# Patient Record
Sex: FEMALE | Race: BLACK OR AFRICAN AMERICAN | Employment: OTHER | URBAN - METROPOLITAN AREA
[De-identification: names, ages, dates, MRNs, and addresses within clinical notes are randomized per-mention and may not be internally consistent; named-entity substitution may affect disease eponyms.]

---

## 2019-05-06 ENCOUNTER — HOSPITAL ENCOUNTER (EMERGENCY)
Facility: HOSPITAL | Age: 84
Discharge: HOME | End: 2019-05-06
Attending: EMERGENCY MEDICINE
Payer: MEDICARE

## 2019-05-06 VITALS
HEIGHT: 65 IN | SYSTOLIC BLOOD PRESSURE: 155 MMHG | TEMPERATURE: 98 F | WEIGHT: 135 LBS | BODY MASS INDEX: 22.49 KG/M2 | RESPIRATION RATE: 18 BRPM | DIASTOLIC BLOOD PRESSURE: 58 MMHG | HEART RATE: 49 BPM | OXYGEN SATURATION: 93 %

## 2019-05-06 DIAGNOSIS — K40.90 UNILATERAL INGUINAL HERNIA WITHOUT OBSTRUCTION OR GANGRENE, RECURRENCE NOT SPECIFIED: Primary | ICD-10-CM

## 2019-05-06 PROCEDURE — 85025 COMPLETE CBC W/AUTO DIFF WBC: CPT | Performed by: EMERGENCY MEDICINE

## 2019-05-06 PROCEDURE — 36415 COLL VENOUS BLD VENIPUNCTURE: CPT

## 2019-05-06 PROCEDURE — 80048 BASIC METABOLIC PNL TOTAL CA: CPT | Performed by: EMERGENCY MEDICINE

## 2019-05-06 PROCEDURE — 99283 EMERGENCY DEPT VISIT LOW MDM: CPT

## 2019-05-06 NOTE — ED INITIAL ASSESSMENT (HPI)
Patient with known left inguinal hernia. Her daughter has been able to reduce it until today.     Denies any other symptoms

## 2019-05-07 NOTE — ED PROVIDER NOTES
Patient Seen in: [facility] AND [facility] Emergency Department    History   Patient presents with:  Hernia    Stated Complaint:     HPI    80year old female with pmh copd, htn, left inguinal hernia present for past few years who is brought by family for concer passive range of motion without pain. Neck supple. No neck rigidity. Normal range of motion present. Cardiovascular: Normal rate, regular rhythm, normal heart sounds and intact distal pulses. No murmur heard.   Pulmonary/Chest: Effort normal and breath acute on chronic issue regarding L inguinal hernia. On my exam hernia is reducible and family agree the hard mass they felt earlier is gone.  CT ordered to further assess area, however while waiting for PO contrast pt family advises that she is acting john

## 2020-03-20 ENCOUNTER — APPOINTMENT (OUTPATIENT)
Dept: GENERAL RADIOLOGY | Facility: HOSPITAL | Age: 85
DRG: 190 | End: 2020-03-20
Attending: EMERGENCY MEDICINE
Payer: MEDICARE

## 2020-03-20 ENCOUNTER — HOSPITAL ENCOUNTER (INPATIENT)
Facility: HOSPITAL | Age: 85
LOS: 3 days | Discharge: HOME | DRG: 190 | End: 2020-03-23
Attending: EMERGENCY MEDICINE | Admitting: HOSPITALIST
Payer: MEDICARE

## 2020-03-20 DIAGNOSIS — N30.00 ACUTE CYSTITIS WITHOUT HEMATURIA: Primary | ICD-10-CM

## 2020-03-20 DIAGNOSIS — J06.9 VIRAL UPPER RESPIRATORY TRACT INFECTION: ICD-10-CM

## 2020-03-20 PROCEDURE — 99223 1ST HOSP IP/OBS HIGH 75: CPT | Performed by: HOSPITALIST

## 2020-03-20 PROCEDURE — 71045 X-RAY EXAM CHEST 1 VIEW: CPT | Performed by: EMERGENCY MEDICINE

## 2020-03-20 RX ORDER — ACETAMINOPHEN 325 MG/1
650 TABLET ORAL ONCE
Status: COMPLETED | OUTPATIENT
Start: 2020-03-20 | End: 2020-03-20

## 2020-03-20 RX ORDER — ACETAMINOPHEN 500 MG
1000 TABLET ORAL ONCE
Status: DISCONTINUED | OUTPATIENT
Start: 2020-03-20 | End: 2020-03-23

## 2020-03-20 RX ORDER — SODIUM CHLORIDE 9 MG/ML
125 INJECTION, SOLUTION INTRAVENOUS CONTINUOUS
Status: DISCONTINUED | OUTPATIENT
Start: 2020-03-20 | End: 2020-03-21

## 2020-03-20 NOTE — ED PROVIDER NOTES
Patient Seen in: [facility] AND [facility] Emergency Department      History   Patient presents with:  Cough/URI    Stated Complaint: cough and fever    HPI    The patient is an 60-year-old female with a history of COPD and hypertension who presents from home wi Eyes:      Conjunctiva/sclera: Conjunctivae normal.      Pupils: Pupils are equal, round, and reactive to light. Neck:      Musculoskeletal: Normal range of motion and neck supple. Vascular: No JVD.    Cardiovascular:      Rate and Rhythm: Normal r following components:    C-Reactive Protein 1.22 (*)     All other components within normal limits   CBC W/ DIFFERENTIAL - Abnormal; Notable for the following components:    RBC 5.31 (*)     HCT 48.4 (*)     Lymphocyte Absolute 0.82 (*)     All other compo with volume loss likely related to scarring. 4. Moderate to large retrocardiac hiatal hernia.     Dictated by (CST): Ira Portillo MD on 3/20/2020 at 8:17 PM     Finalized by (CST): Ira Portillo MD on 3/20/2020 at 8:19 PM            Radiology exams  View

## 2020-03-21 PROCEDURE — 99233 SBSQ HOSP IP/OBS HIGH 50: CPT | Performed by: HOSPITALIST

## 2020-03-21 RX ORDER — SODIUM CHLORIDE 9 MG/ML
INJECTION, SOLUTION INTRAVENOUS CONTINUOUS
Status: DISCONTINUED | OUTPATIENT
Start: 2020-03-21 | End: 2020-03-23

## 2020-03-21 RX ORDER — ALBUTEROL SULFATE 90 UG/1
1 AEROSOL, METERED RESPIRATORY (INHALATION)
Status: DISCONTINUED | OUTPATIENT
Start: 2020-03-21 | End: 2020-03-23

## 2020-03-21 RX ORDER — METHYLPREDNISOLONE SODIUM SUCCINATE 40 MG/ML
32 INJECTION, POWDER, LYOPHILIZED, FOR SOLUTION INTRAMUSCULAR; INTRAVENOUS EVERY 12 HOURS
Status: DISCONTINUED | OUTPATIENT
Start: 2020-03-21 | End: 2020-03-22

## 2020-03-21 RX ORDER — ONDANSETRON 2 MG/ML
4 INJECTION INTRAMUSCULAR; INTRAVENOUS EVERY 6 HOURS PRN
Status: DISCONTINUED | OUTPATIENT
Start: 2020-03-21 | End: 2020-03-23

## 2020-03-21 RX ORDER — DORZOLAMIDE HYDROCHLORIDE AND TIMOLOL MALEATE 20; 5 MG/ML; MG/ML
1 SOLUTION/ DROPS OPHTHALMIC NIGHTLY
Status: DISCONTINUED | OUTPATIENT
Start: 2020-03-21 | End: 2020-03-23

## 2020-03-21 RX ORDER — METOPROLOL TARTRATE 50 MG/1
50 TABLET, FILM COATED ORAL 2 TIMES DAILY
Status: ON HOLD | COMMUNITY
End: 2020-09-26

## 2020-03-21 RX ORDER — POLYETHYLENE GLYCOL 3350 17 G/17G
17 POWDER, FOR SOLUTION ORAL DAILY
Status: ON HOLD | COMMUNITY
End: 2020-09-26

## 2020-03-21 RX ORDER — LATANOPROST 50 UG/ML
1 SOLUTION/ DROPS OPHTHALMIC NIGHTLY
Status: DISCONTINUED | OUTPATIENT
Start: 2020-03-21 | End: 2020-03-23

## 2020-03-21 RX ORDER — FAMOTIDINE 10 MG/ML
20 INJECTION, SOLUTION INTRAVENOUS DAILY
Status: DISCONTINUED | OUTPATIENT
Start: 2020-03-21 | End: 2020-03-23

## 2020-03-21 RX ORDER — POTASSIUM CHLORIDE 20 MEQ/1
40 TABLET, EXTENDED RELEASE ORAL EVERY 4 HOURS
Status: DISCONTINUED | OUTPATIENT
Start: 2020-03-21 | End: 2020-03-21

## 2020-03-21 RX ORDER — HYDROCHLOROTHIAZIDE 12.5 MG/1
12.5 CAPSULE, GELATIN COATED ORAL DAILY
Status: ON HOLD | COMMUNITY
End: 2020-09-26

## 2020-03-21 RX ORDER — POTASSIUM CHLORIDE 20 MEQ/1
40 TABLET, EXTENDED RELEASE ORAL EVERY 4 HOURS
Status: COMPLETED | OUTPATIENT
Start: 2020-03-21 | End: 2020-03-21

## 2020-03-21 RX ORDER — ACETAMINOPHEN 325 MG/1
650 TABLET ORAL EVERY 6 HOURS PRN
Status: DISCONTINUED | OUTPATIENT
Start: 2020-03-21 | End: 2020-03-23

## 2020-03-21 RX ORDER — HYDRALAZINE HYDROCHLORIDE 20 MG/ML
10 INJECTION INTRAMUSCULAR; INTRAVENOUS EVERY 4 HOURS PRN
Status: DISCONTINUED | OUTPATIENT
Start: 2020-03-21 | End: 2020-03-23

## 2020-03-21 RX ORDER — HEPARIN SODIUM 5000 [USP'U]/ML
5000 INJECTION, SOLUTION INTRAVENOUS; SUBCUTANEOUS EVERY 12 HOURS SCHEDULED
Status: DISCONTINUED | OUTPATIENT
Start: 2020-03-21 | End: 2020-03-23

## 2020-03-21 RX ORDER — AZITHROMYCIN 250 MG/1
500 TABLET, FILM COATED ORAL DAILY
Status: DISCONTINUED | OUTPATIENT
Start: 2020-03-21 | End: 2020-03-23

## 2020-03-21 RX ORDER — METHYLPREDNISOLONE SODIUM SUCCINATE 40 MG/ML
40 INJECTION, POWDER, LYOPHILIZED, FOR SOLUTION INTRAMUSCULAR; INTRAVENOUS EVERY 12 HOURS
Status: DISCONTINUED | OUTPATIENT
Start: 2020-03-21 | End: 2020-03-21

## 2020-03-21 RX ORDER — LATANOPROST 50 UG/ML
SOLUTION/ DROPS OPHTHALMIC NIGHTLY
COMMUNITY

## 2020-03-21 RX ORDER — DORZOLAMIDE HYDROCHLORIDE AND TIMOLOL MALEATE 20; 5 MG/ML; MG/ML
1 SOLUTION/ DROPS OPHTHALMIC 2 TIMES DAILY
COMMUNITY

## 2020-03-21 RX ORDER — SODIUM CHLORIDE 9 MG/ML
INJECTION, SOLUTION INTRAVENOUS CONTINUOUS
Status: DISCONTINUED | OUTPATIENT
Start: 2020-03-21 | End: 2020-03-21

## 2020-03-21 NOTE — PROGRESS NOTES
[facility]    Progress Note    [facility] Patient Status:  Inpatient    1934 MRN [id number]   Location [facility]5W Attending Twyla Modi Day # 1 PCP None Pcp        Subjective:     Constitutional: health     Disposition  Home Monday may need extra help at home; pt wheelchair bound    cxr images reviewed    39 min spent on pt of which 22 min spent coordinating care with sw/nurse and counseling pts daughter with pts nodded permission about need fu cov

## 2020-03-21 NOTE — SLP NOTE
ADULT SWALLOWING EVALUATION    ASSESSMENT    ASSESSMENT/OVERALL IMPRESSION:  SLP BSSE orders received and acknowledged. A swallow evaluation warranted as pt admitted with SOB, fever, cough for reportedly 2 days.  Pt with clear vocal quality, on room air, wi History  Past Medical History:   Diagnosis Date   • COPD (chronic obstructive pulmonary disease) ([facility] [address]    • Essential hypertension    • Glaucoma    • Inguinal hernia        Prior Living Situation: Home with support  Diet Prior to Admission: Regular; Thin li session(s).     In Progress   Goal #3 The patient will utilize compensatory strategies as outlined by  BSSE (clinical evaluation) including Slow rate, Small bites, Small sips, Multiple swallows, Alternate liquids/solids, No straws, Upright 90 degrees with m

## 2020-03-21 NOTE — H&P
[address] Patient Status:  Emergency    1934 MRN [id number]   Location [facility] Attending Marielos Gibson MD   Hosp Day # 0 PCP None Pcp     Date:  3/20/2020 Normocephalic, oral mucosa is moist.  Head:  Normocephalic, atraumatic. Neck:  Supple, non-tender, no carotid bruit, no jugular venous distention, no lymphadenopathy, no thyromegaly.   Respiratory:  Lungs are clear to auscultation, no significant wheezing COVID-19. Acute UTI  Patient start Rocephin 1 g IV piggyback every 24 hours, cultures pending at this time. Uncontrolled hypertension  We will resume home dose of medication, use IV hydralazine as needed for systolic pressures greater than 160.     GE

## 2020-03-21 NOTE — PLAN OF CARE
Pt resting in bed, ambulates with assistance. IVF infusing. IV abx. IV solumedrol. Speech therapist cleared pt for regular diet with thin liquids after swallow eval. PT/OT ordered. Incontinent care provided. No complaints at this time.      Problem: Daivna handling equipment as needed  - Ensure adequate protection for wounds/incisions during mobilization  - Obtain PT/OT consults as needed  - Advance activity as appropriate  - Communicate ordered activity level and limitations with patient/family  Outcome: Pr

## 2020-03-21 NOTE — CM/SW NOTE
03/21/20 1300   CM/SW Referral Data   Referral Source Physician   Reason for Referral Discharge planning   Patient Info   Advanced directives? No   Information provided?  Yes   Patient's Mental Status Alert;Confused;Memory Impairments   Patient's Home En has a transfer wheelchair. Will follow up for choice of HH and on status of DME. Addendum 3/23/2020- Pt ready for discharge. Left son, Terald Lefort,  a message to determine if he would like HH. Also called Bill Chin RN to determine need for [facility].   Per Bill Chin, no

## 2020-03-21 NOTE — PLAN OF CARE
Patient's son Mariam Aguirre informed [place] that he and his sister Isabella Mera) was planning on discussing advanced directives for patient when Shirley Larson returns from [address].  Writer explained that until a definitive decision can be made the patient will be FULL CODE

## 2020-03-21 NOTE — PLAN OF CARE
Patient with no complaints over night. She is very forgetful with intermitent episodes of confusion (especially upon awakening), but she is very pleasant and easily redirected.  IV abx for UTI/URI, r/o COVID-19, PT/OT/SL ordered due to son stating that the Assist with transfers and ambulation using safe patient handling equipment as needed  - Ensure adequate protection for wounds/incisions during mobilization  - Obtain PT/OT consults as needed  - Advance activity as appropriate  - Communicate ordered activit choose  - Honor patient and family perspectives and choices   Outcome: Progressing

## 2020-03-21 NOTE — ED NOTES
Writer spoke with receiving lab who reports able to run pro calcitonin off rainbow draw collected earlier.

## 2020-03-22 PROCEDURE — 99233 SBSQ HOSP IP/OBS HIGH 50: CPT | Performed by: HOSPITALIST

## 2020-03-22 RX ORDER — METHYLPREDNISOLONE SODIUM SUCCINATE 40 MG/ML
20 INJECTION, POWDER, LYOPHILIZED, FOR SOLUTION INTRAMUSCULAR; INTRAVENOUS EVERY 12 HOURS
Status: DISCONTINUED | OUTPATIENT
Start: 2020-03-22 | End: 2020-03-23

## 2020-03-22 NOTE — PLAN OF CARE
Problem: Patient/Family Goals  Goal: Patient/Family Long Term Goal  Description  Patient's Long Term Goal: patient's son - Ric Pang would like for patient to return home. Interventions:  - IV abx, PT/OT/SL, IVF, blood cultures.   - See additional Care Plan patient/family  Outcome: Progressing     Problem: Impaired Swallowing  Goal: Minimize aspiration risk  Description  Interventions:  - Patient should be alert and upright for all feedings (90 degrees preferred)  - Offer food and liquids at a slow rate  - No

## 2020-03-22 NOTE — PROGRESS NOTES
[facility] Pharmacy Note:  Renal Adjustment for cefazolin (ANCEF)    Padma Rodriguez is a 80year old female who has been prescribed cefazolin (ANCEF) 1000 mg every 8 hrs. CrCl is estimated creatinine clearance is 37 mL/min (A) (based on SCr of 1.04 mg/dL (H)).

## 2020-03-22 NOTE — PROGRESS NOTES
[facility]    Progress Note    Dierdre Mast Patient Status:  Inpatient    1934 MRN [id number]   Location [facility]5W Attending Twyla Modi Day # 2 PCP None Pcp        Subjective:     Constitutional: agitation.     Prophylaxis  Subcutaneous heparin     CODE STATUS  Full code dw family     Primary care physician  Dr Chua [facility]     Disposition  Home Monday may need extra help at home; pt wheelchair bound      36 min spent on pt of which 20 Amy Cruz MD  3/21/2020

## 2020-03-23 VITALS
DIASTOLIC BLOOD PRESSURE: 85 MMHG | WEIGHT: 147.88 LBS | TEMPERATURE: 98 F | HEART RATE: 80 BPM | SYSTOLIC BLOOD PRESSURE: 111 MMHG | RESPIRATION RATE: 18 BRPM | OXYGEN SATURATION: 99 % | HEIGHT: 66 IN | BODY MASS INDEX: 23.77 KG/M2

## 2020-03-23 PROCEDURE — 99239 HOSP IP/OBS DSCHRG MGMT >30: CPT | Performed by: HOSPITALIST

## 2020-03-23 RX ORDER — CEPHALEXIN 500 MG/1
500 CAPSULE ORAL 3 TIMES DAILY
Qty: 13 CAPSULE | Refills: 0 | Status: ON HOLD | OUTPATIENT
Start: 2020-03-23 | End: 2020-09-26

## 2020-03-23 RX ORDER — ALBUTEROL SULFATE 90 UG/1
1 AEROSOL, METERED RESPIRATORY (INHALATION)
Qty: 1 INHALER | Refills: 0 | Status: ON HOLD | OUTPATIENT
Start: 2020-03-23 | End: 2020-09-26

## 2020-03-23 RX ORDER — PREDNISONE 10 MG/1
40 TABLET ORAL DAILY
Qty: 17 TABLET | Refills: 0 | Status: ON HOLD | OUTPATIENT
Start: 2020-03-23 | End: 2020-09-26

## 2020-03-23 RX ORDER — ACETAMINOPHEN 325 MG/1
650 TABLET ORAL EVERY 6 HOURS PRN
Qty: 1 TABLET | Refills: 0 | Status: ON HOLD | COMMUNITY
Start: 2020-03-23 | End: 2020-09-26

## 2020-03-23 NOTE — PLAN OF CARE
Pt resting in bed, ambulates with assistance. Pt used rolling chair and stand & pivot to use toilet. Incontinent care provided. Receiving IV abx and IV solumedrol. Received PRN tylenol for pain in left foot. Contact/droplet isolation for RO Coronavirus.  No Assess patient stability and activity tolerance for standing, transferring and ambulating w/ or w/o assistive devices  - Assist with transfers and ambulation using safe patient handling equipment as needed  - Ensure adequate protection for wounds/incisions perspectives and choices   Outcome: Progressing

## 2020-03-23 NOTE — DISCHARGE SUMMARY
[facility]    PATIENT'S NAME: Edda Ball   ATTENDING PHYSICIAN: Tyson Modi MD   PATIENT ACCOUNT#:   [id number]    LOCATION:  [address] [place] [address] RECORD #:   [id number]       YOB: 1934  ADMISSION DATE:       03/20/20 exacerbation, stable. The patient currently doing well. Did have a dry cough, started on Zithromax. Actually was placed in isolation, and all isolation procedures were meticulously followed. We are awaiting rule out for COVID 19.   I discussed with her

## 2020-03-23 NOTE — DISCHARGE SUMMARY
Dc summary#[id number]  > 30 min spent on [facility] Discharge Diagnoses: uti with sepsis, bronchitis    Lace+ Score: 45  59-90 High Risk  29-58 Medium Risk  0-28   Low Risk. TCM Follow-Up Recommendation:  LACE 29-58:  Moderate Risk of readmission after

## 2020-03-23 NOTE — PLAN OF CARE
SBP 180s this AM. PRN IV Hydralazine administered. Con't IVF, IV solumedrol, and IV abx. Con't prompt incontinence care. Plan to be discharged home with family when medically cleared. Pending COVID results.      Problem: Patient/Family Goals  Goal: Patient/ Ensure adequate protection for wounds/incisions during mobilization  - Obtain PT/OT consults as needed  - Advance activity as appropriate  - Communicate ordered activity level and limitations with patient/family  Outcome: Progressing     Problem: Impaired Melolabial Transposition Flap Text: The defect edges were debeveled with a #15 scalpel blade.  Given the location of the defect and the proximity to free margins a melolabial flap was deemed most appropriate.  Using a sterile surgical marker, an appropriate melolabial transposition flap was drawn incorporating the defect.    The area thus outlined was incised deep to adipose tissue with a #15 scalpel blade.  The skin margins were undermined to an appropriate distance in all directions utilizing iris scissors.

## 2020-03-24 ENCOUNTER — TELEPHONE (OUTPATIENT)
Dept: INTERNAL MEDICINE CLINIC | Facility: CLINIC | Age: 85
End: 2020-03-24

## 2020-03-24 NOTE — TELEPHONE ENCOUNTER
Spoke with patient's daughter (patient unable to receive results d/t dementia). Per daughter they have already received COVID-19 test result but wished to discuss tx protocol.  The following information was provided:     Spoke with patient's daughter and co

## 2020-03-25 NOTE — PAYOR COMM NOTE
--------------  ADMISSION REVIEW     Liang Blum MA O  Subscriber #:  [id number]  Authorization Number: [id number]    Admit date: 3/20/20  Admit time: 2348       Admitting Physician: Moreno Cristina MD  Attending Physician:  No att. providers found  [facility] Temp src Oral   SpO2 97 %   O2 Device None (Room air)       Current:BP (!) 179/80   Pulse 80   Temp 100 °F (37.8 °C) (Oral)   Resp 20   Ht 167.6 cm (5' 6\")   Wt 72.6 kg   SpO2 93%   BMI 25.82 kg/m²          Physical Exam  Vitals signs and nursing note rev Result Value    Sodium 135 (*)     Creatinine 1.32 (*)     BUN/CREA Ratio 6.1 (*)     GFR, Non- 37 (*)     GFR, -American 42 (*)     All other components within normal limits   URINALYSIS WITH CULTURE REFLEX - Abnormal; Notable f Patient with positive urinary tract infection for which ceftriaxone was started but also continues to have mild tachypnea and coarse breath sounds with normal chest x-ray. Covid test is pending. Patient comfortable with admission plan.   Vital signs stabl Author:  Naina Abbott MD Service:  Curly Goldmann Author Type:  Physician    Filed:  3/21/2020  7:19 AM Date of Service:  3/20/2020 10:25 PM Status:  Signed    :  Naina Abbott MD (Physician)         [facility] 73 BP: (168-179)/(80-87) 179/80    General: Awake but confused. Diffuse skin problem:  None. Eye:  Pupils are equal, round and reactive to light, extraocular movements are intact, Normal conjunctiva.   HENT:  Normocephalic, oral mucosa is moist.  Head:  Norm We will start patient on duo nebs/MDI and steroids, start Zosyn 3.375 g IV piggyback every 8 hours considering possibility of aspiration as a cause. Expanded flu panel negative, patient to be ruled out for COVID-19.     Acute UTI  Patient start Rocephin 1 Blood pressure 135/52, pulse 97, temperature 98.1 °F (36.7 °C), temperature source Oral, resp. rate 21, height 5' 6\" (1.676 m), weight 143 lb (64.9 kg), SpO2 95 %.     Nursing note and vitals reviewed. Constitutional: She is thin.  She appears well-devel   TROP <0.045 03/20/2020         Xr Chest Ap Portable  (cpt=71045)     Result Date: 3/20/2020  CONCLUSION:  1. No evidence of pneumonia. 2. Slight elevation of the right hemidiaphragm with atelectasis and or scar in the right lower lung.  3. Right apical pl Gastrointestinal: Negative for abdominal pain. Genitourinary: Negative for dysuria. Musculoskeletal: Positive for joint swelling and joint pain. Neurological: Positive for weakness. Negative for light-headedness.    Psychiatric/Behavioral: Positive fo Results:            Lab Results   Component Value Date     WBC 4.7 03/22/2020     HGB 12.5 03/22/2020     HCT 39.1 03/22/2020     .0 03/22/2020     CREATSERUM 1.04 (H) 03/22/2020     BUN 10 03/22/2020      03/22/2020     K 4.4 03/22/2020     C

## 2020-03-25 NOTE — PAYOR COMM NOTE
--------------  DISCHARGE REVIEW    Keven Landers MA [facility]  Subscriber #:  [id number]  Authorization Number: [id number]    Admit date: 3/20/20  Admit time:  2348  Discharge Date: 3/23/2020  2:13 PM     Admitting Physician: Renae Aguilar MD  Attending Physic wheelchair-bound, but her daughter stated that they probably would not need or qualify for any more resources and would be able to take her home.  All infection control procedures were followed as per recommendations    PHYSICAL EXAMINATION:    VITAL SIGNS: hours as needed. ACTIVITY:  As tolerated. DIET:  Low salt, low cholesterol. FOLLOWUP:  Dr. Tru Montesinos at [facility]. Follow up for COVID results. I discussed the importance of needing a definite answer.   Follow up to check on her when it is

## 2020-04-04 ENCOUNTER — HOSPITAL ENCOUNTER (EMERGENCY)
Facility: HOSPITAL | Age: 85
Discharge: HOME | End: 2020-04-04
Attending: EMERGENCY MEDICINE
Payer: MEDICARE

## 2020-04-04 ENCOUNTER — APPOINTMENT (OUTPATIENT)
Dept: GENERAL RADIOLOGY | Facility: HOSPITAL | Age: 85
End: 2020-04-04
Attending: EMERGENCY MEDICINE
Payer: MEDICARE

## 2020-04-04 VITALS
TEMPERATURE: 99 F | HEART RATE: 84 BPM | WEIGHT: 165 LBS | SYSTOLIC BLOOD PRESSURE: 142 MMHG | RESPIRATION RATE: 21 BRPM | BODY MASS INDEX: 28.17 KG/M2 | DIASTOLIC BLOOD PRESSURE: 66 MMHG | OXYGEN SATURATION: 95 % | HEIGHT: 64 IN

## 2020-04-04 DIAGNOSIS — E86.0 DEHYDRATION: Primary | ICD-10-CM

## 2020-04-04 PROCEDURE — 93005 ELECTROCARDIOGRAM TRACING: CPT

## 2020-04-04 PROCEDURE — 99284 EMERGENCY DEPT VISIT MOD MDM: CPT

## 2020-04-04 PROCEDURE — 93010 ELECTROCARDIOGRAM REPORT: CPT | Performed by: EMERGENCY MEDICINE

## 2020-04-04 PROCEDURE — 71045 X-RAY EXAM CHEST 1 VIEW: CPT | Performed by: EMERGENCY MEDICINE

## 2020-04-04 PROCEDURE — 85025 COMPLETE CBC W/AUTO DIFF WBC: CPT | Performed by: EMERGENCY MEDICINE

## 2020-04-04 PROCEDURE — 80076 HEPATIC FUNCTION PANEL: CPT | Performed by: EMERGENCY MEDICINE

## 2020-04-04 PROCEDURE — 84484 ASSAY OF TROPONIN QUANT: CPT | Performed by: EMERGENCY MEDICINE

## 2020-04-04 PROCEDURE — 96360 HYDRATION IV INFUSION INIT: CPT

## 2020-04-04 PROCEDURE — 81001 URINALYSIS AUTO W/SCOPE: CPT | Performed by: EMERGENCY MEDICINE

## 2020-04-04 PROCEDURE — 80048 BASIC METABOLIC PNL TOTAL CA: CPT | Performed by: EMERGENCY MEDICINE

## 2020-04-05 NOTE — ED NOTES
Pt resting comfortably in stretcher. She is able to answer questions appropriately during RN reassessment. IVF bolus infusing.

## 2020-04-05 NOTE — ED PROVIDER NOTES
Patient Seen in: [facility] AND [facility] Emergency Department      History   Patient presents with:  Fatigue    Stated Complaint: decreased appetitie weakness     HPI    63-year-old female presents the ER for generalized weakness decreased appetite.   Patient w Conjunctiva/sclera: Conjunctivae normal.      Pupils: Pupils are equal, round, and reactive to light. Neck:      Musculoskeletal: Normal range of motion and neck supple. Cardiovascular:      Rate and Rhythm: Normal rate and regular rhythm.       Pu WITH DIFFERENTIAL WITH PLATELET.   Procedure                               Abnormality         Status                     ---------                               -----------         ------                     CBC W/ DIFFERENTIAL[[id number]]          Abnormal

## 2020-04-05 NOTE — ED NOTES
DAUGHTER AT BEDSIDE WHO PROVIDES HISTORY FOR PATIENT. PT HAS BASELINE DEMENTIA, HOWEVER IN THE PAST FEW DAYS HAS BEEN LESS VERBAL, NOT PERFORMING ADL'S INDEPENDENTLY AND REQUIRING MORE ASSISTANCE WITH AMBULATION. USES A WALKER AT HOME.  PT IS ALERT NOW, BUT

## 2020-04-05 NOTE — CM/SW NOTE
Called [facility] at Ext [phone] and LM to set up [facility] for patient and to call [facility] for any questions. Face to Face completed. MD and RN aware.

## 2020-04-05 NOTE — CM/SW NOTE
Spoke with daughter and patient regarding patient's current home situation. Patient has admitted to [facility] on 3/20/20 for UTI and viral respiratory tract infection and was tested COVID-19 positive.   Two of patient's children, a son and daughter, as also posi

## 2020-09-07 ENCOUNTER — APPOINTMENT (OUTPATIENT)
Dept: CT IMAGING | Facility: HOSPITAL | Age: 85
End: 2020-09-07
Attending: EMERGENCY MEDICINE
Payer: MEDICARE

## 2020-09-07 ENCOUNTER — HOSPITAL ENCOUNTER (EMERGENCY)
Facility: HOSPITAL | Age: 85
Discharge: HOME | End: 2020-09-07
Attending: EMERGENCY MEDICINE
Payer: MEDICARE

## 2020-09-07 VITALS
TEMPERATURE: 99 F | RESPIRATION RATE: 15 BRPM | OXYGEN SATURATION: 98 % | SYSTOLIC BLOOD PRESSURE: 148 MMHG | HEART RATE: 73 BPM | DIASTOLIC BLOOD PRESSURE: 87 MMHG

## 2020-09-07 DIAGNOSIS — R11.2 NON-INTRACTABLE VOMITING WITH NAUSEA, UNSPECIFIED VOMITING TYPE: Primary | ICD-10-CM

## 2020-09-07 LAB
ALBUMIN SERPL-MCNC: 3.5 G/DL (ref 3.4–5)
ALP LIVER SERPL-CCNC: 96 U/L (ref 55–142)
ALT SERPL-CCNC: 9 U/L (ref 13–56)
ANION GAP SERPL CALC-SCNC: 7 MMOL/L (ref 0–18)
AST SERPL-CCNC: 24 U/L (ref 15–37)
BASOPHILS # BLD AUTO: 0.03 X10(3) UL (ref 0–0.2)
BASOPHILS NFR BLD AUTO: 0.4 %
BILIRUB DIRECT SERPL-MCNC: 0.2 MG/DL (ref 0–0.2)
BILIRUB SERPL-MCNC: 0.9 MG/DL (ref 0.1–2)
BILIRUB UR QL: NEGATIVE
BUN BLD-MCNC: 14 MG/DL (ref 7–18)
BUN/CREAT SERPL: 11.3 (ref 10–20)
CALCIUM BLD-MCNC: 9.4 MG/DL (ref 8.5–10.1)
CHLORIDE SERPL-SCNC: 101 MMOL/L (ref 98–112)
CO2 SERPL-SCNC: 32 MMOL/L (ref 21–32)
COLOR UR: YELLOW
CREAT BLD-MCNC: 1.24 MG/DL (ref 0.55–1.02)
DEPRECATED RDW RBC AUTO: 41 FL (ref 35.1–46.3)
EOSINOPHIL # BLD AUTO: 0.01 X10(3) UL (ref 0–0.7)
EOSINOPHIL NFR BLD AUTO: 0.1 %
ERYTHROCYTE [DISTWIDTH] IN BLOOD BY AUTOMATED COUNT: 12.4 % (ref 11–15)
GLUCOSE BLD-MCNC: 101 MG/DL (ref 70–99)
GLUCOSE UR-MCNC: NEGATIVE MG/DL
HCT VFR BLD AUTO: 45.4 % (ref 35–48)
HGB BLD-MCNC: 14.5 G/DL (ref 12–16)
IMM GRANULOCYTES # BLD AUTO: 0.01 X10(3) UL (ref 0–1)
IMM GRANULOCYTES NFR BLD: 0.1 %
LEUKOCYTE ESTERASE UR QL STRIP.AUTO: NEGATIVE
LIPASE SERPL-CCNC: 89 U/L (ref 73–393)
LYMPHOCYTES # BLD AUTO: 1.12 X10(3) UL (ref 1–4)
LYMPHOCYTES NFR BLD AUTO: 13.7 %
M PROTEIN MFR SERPL ELPH: 8.2 G/DL (ref 6.4–8.2)
MCH RBC QN AUTO: 28.7 PG (ref 26–34)
MCHC RBC AUTO-ENTMCNC: 31.9 G/DL (ref 31–37)
MCV RBC AUTO: 89.7 FL (ref 80–100)
MONOCYTES # BLD AUTO: 0.45 X10(3) UL (ref 0.1–1)
MONOCYTES NFR BLD AUTO: 5.5 %
NEUTROPHILS # BLD AUTO: 6.56 X10 (3) UL (ref 1.5–7.7)
NEUTROPHILS # BLD AUTO: 6.56 X10(3) UL (ref 1.5–7.7)
NEUTROPHILS NFR BLD AUTO: 80.2 %
NITRITE UR QL STRIP.AUTO: NEGATIVE
OSMOLALITY SERPL CALC.SUM OF ELEC: 291 MOSM/KG (ref 275–295)
PH UR: 5 [PH] (ref 5–8)
PLATELET # BLD AUTO: 201 10(3)UL (ref 150–450)
POTASSIUM SERPL-SCNC: 3.8 MMOL/L (ref 3.5–5.1)
PROT UR-MCNC: 30 MG/DL
RBC # BLD AUTO: 5.06 X10(6)UL (ref 3.8–5.3)
RBC #/AREA URNS AUTO: 23 /HPF
SODIUM SERPL-SCNC: 140 MMOL/L (ref 136–145)
SP GR UR STRIP: 1.02 (ref 1–1.03)
UROBILINOGEN UR STRIP-ACNC: 2
WBC # BLD AUTO: 8.2 X10(3) UL (ref 4–11)
WBC #/AREA URNS AUTO: 3 /HPF

## 2020-09-07 PROCEDURE — 83690 ASSAY OF LIPASE: CPT | Performed by: EMERGENCY MEDICINE

## 2020-09-07 PROCEDURE — 96361 HYDRATE IV INFUSION ADD-ON: CPT

## 2020-09-07 PROCEDURE — 74176 CT ABD & PELVIS W/O CONTRAST: CPT | Performed by: EMERGENCY MEDICINE

## 2020-09-07 PROCEDURE — 80048 BASIC METABOLIC PNL TOTAL CA: CPT | Performed by: EMERGENCY MEDICINE

## 2020-09-07 PROCEDURE — 81001 URINALYSIS AUTO W/SCOPE: CPT | Performed by: EMERGENCY MEDICINE

## 2020-09-07 PROCEDURE — 96374 THER/PROPH/DIAG INJ IV PUSH: CPT

## 2020-09-07 PROCEDURE — 99284 EMERGENCY DEPT VISIT MOD MDM: CPT

## 2020-09-07 PROCEDURE — 85025 COMPLETE CBC W/AUTO DIFF WBC: CPT | Performed by: EMERGENCY MEDICINE

## 2020-09-07 PROCEDURE — 80076 HEPATIC FUNCTION PANEL: CPT | Performed by: EMERGENCY MEDICINE

## 2020-09-07 RX ORDER — ONDANSETRON 4 MG/1
4 TABLET, ORALLY DISINTEGRATING ORAL EVERY 4 HOURS PRN
Qty: 10 TABLET | Refills: 0 | Status: ON HOLD | OUTPATIENT
Start: 2020-09-07 | End: 2020-09-26

## 2020-09-07 RX ORDER — ONDANSETRON 2 MG/ML
4 INJECTION INTRAMUSCULAR; INTRAVENOUS ONCE
Status: COMPLETED | OUTPATIENT
Start: 2020-09-07 | End: 2020-09-07

## 2020-09-07 NOTE — ED PROVIDER NOTES
Patient Seen in: [facility] AND [facility] Emergency Department      History   Patient presents with:  Nausea/Vomiting/Diarrhea    Stated Complaint: vomiting    HPI    45-year-old female with past medical history significant for COPD, high blood pressure, glauco pulses  Pulmonary/Chest: CTA b/l with no rales, wheezes. No chest wall tenderness  Abdominal: Mild left lower quadrant tenderness without rebound or guarding. Nondistended. Soft. Bowel sounds are normal.   Back:   :    Musculoskeletal: Normal range of m obstruction although transition point not clearly visualized. Small left inguinal hernia containing fat and small bowel. This hernia does not appear to be the site of transition/obstruction of the above described small bowel obstruction.   Colonic diverti

## 2020-09-07 NOTE — ED NOTES
Pt c/o n/v and generalized abd pain x3days. Pt denies fevers, diarrhea, constipation, urinary sx, hx of GI surgery. Pt c/o abd tenderness with palpation. Will continue to monitor.

## 2020-09-08 ENCOUNTER — HOSPITAL ENCOUNTER (INPATIENT)
Facility: HOSPITAL | Age: 85
LOS: 18 days | Discharge: HOME HEALTH | DRG: 336 | End: 2020-09-26
Attending: EMERGENCY MEDICINE | Admitting: HOSPITALIST
Payer: MEDICARE

## 2020-09-08 ENCOUNTER — APPOINTMENT (OUTPATIENT)
Dept: GENERAL RADIOLOGY | Facility: HOSPITAL | Age: 85
DRG: 336 | End: 2020-09-08
Attending: EMERGENCY MEDICINE
Payer: MEDICARE

## 2020-09-08 DIAGNOSIS — K56.609 SMALL BOWEL OBSTRUCTION (HCC): Primary | ICD-10-CM

## 2020-09-08 LAB
ANION GAP SERPL CALC-SCNC: 7 MMOL/L (ref 0–18)
BASOPHILS # BLD AUTO: 0.04 X10(3) UL (ref 0–0.2)
BASOPHILS NFR BLD AUTO: 0.3 %
BUN BLD-MCNC: 15 MG/DL (ref 7–18)
BUN/CREAT SERPL: 11.2 (ref 10–20)
CALCIUM BLD-MCNC: 9.6 MG/DL (ref 8.5–10.1)
CHLORIDE SERPL-SCNC: 104 MMOL/L (ref 98–112)
CO2 SERPL-SCNC: 30 MMOL/L (ref 21–32)
CREAT BLD-MCNC: 1.34 MG/DL (ref 0.55–1.02)
DEPRECATED RDW RBC AUTO: 40.5 FL (ref 35.1–46.3)
EOSINOPHIL # BLD AUTO: 0 X10(3) UL (ref 0–0.7)
EOSINOPHIL NFR BLD AUTO: 0 %
ERYTHROCYTE [DISTWIDTH] IN BLOOD BY AUTOMATED COUNT: 12.3 % (ref 11–15)
GLUCOSE BLD-MCNC: 149 MG/DL (ref 70–99)
HCT VFR BLD AUTO: 49.8 % (ref 35–48)
HGB BLD-MCNC: 16 G/DL (ref 12–16)
IMM GRANULOCYTES # BLD AUTO: 0.03 X10(3) UL (ref 0–1)
IMM GRANULOCYTES NFR BLD: 0.2 %
LYMPHOCYTES # BLD AUTO: 0.62 X10(3) UL (ref 1–4)
LYMPHOCYTES NFR BLD AUTO: 5.1 %
MCH RBC QN AUTO: 28.7 PG (ref 26–34)
MCHC RBC AUTO-ENTMCNC: 32.1 G/DL (ref 31–37)
MCV RBC AUTO: 89.4 FL (ref 80–100)
MONOCYTES # BLD AUTO: 0.35 X10(3) UL (ref 0.1–1)
MONOCYTES NFR BLD AUTO: 2.9 %
NEUTROPHILS # BLD AUTO: 11 X10 (3) UL (ref 1.5–7.7)
NEUTROPHILS # BLD AUTO: 11 X10(3) UL (ref 1.5–7.7)
NEUTROPHILS NFR BLD AUTO: 91.5 %
OSMOLALITY SERPL CALC.SUM OF ELEC: 296 MOSM/KG (ref 275–295)
PLATELET # BLD AUTO: 202 10(3)UL (ref 150–450)
POTASSIUM SERPL-SCNC: 4 MMOL/L (ref 3.5–5.1)
RBC # BLD AUTO: 5.57 X10(6)UL (ref 3.8–5.3)
SARS-COV-2 RNA RESP QL NAA+PROBE: NOT DETECTED
SODIUM SERPL-SCNC: 141 MMOL/L (ref 136–145)
WBC # BLD AUTO: 12 X10(3) UL (ref 4–11)

## 2020-09-08 PROCEDURE — 74019 RADEX ABDOMEN 2 VIEWS: CPT | Performed by: EMERGENCY MEDICINE

## 2020-09-08 PROCEDURE — 71045 X-RAY EXAM CHEST 1 VIEW: CPT | Performed by: EMERGENCY MEDICINE

## 2020-09-08 PROCEDURE — 0D9670Z DRAINAGE OF STOMACH WITH DRAINAGE DEVICE, VIA NATURAL OR ARTIFICIAL OPENING: ICD-10-PCS | Performed by: EMERGENCY MEDICINE

## 2020-09-08 PROCEDURE — 99223 1ST HOSP IP/OBS HIGH 75: CPT | Performed by: HOSPITALIST

## 2020-09-08 RX ORDER — SODIUM CHLORIDE 9 MG/ML
INJECTION, SOLUTION INTRAVENOUS CONTINUOUS
Status: DISCONTINUED | OUTPATIENT
Start: 2020-09-08 | End: 2020-09-10

## 2020-09-08 RX ORDER — SODIUM CHLORIDE, SODIUM LACTATE, POTASSIUM CHLORIDE, CALCIUM CHLORIDE 600; 310; 30; 20 MG/100ML; MG/100ML; MG/100ML; MG/100ML
INJECTION, SOLUTION INTRAVENOUS ONCE
Status: COMPLETED | OUTPATIENT
Start: 2020-09-08 | End: 2020-09-08

## 2020-09-08 RX ORDER — ONDANSETRON 2 MG/ML
4 INJECTION INTRAMUSCULAR; INTRAVENOUS EVERY 6 HOURS PRN
Status: DISCONTINUED | OUTPATIENT
Start: 2020-09-08 | End: 2020-09-26

## 2020-09-08 RX ORDER — METOPROLOL TARTRATE 50 MG/1
50 TABLET, FILM COATED ORAL
Status: DISCONTINUED | OUTPATIENT
Start: 2020-09-08 | End: 2020-09-14

## 2020-09-08 RX ORDER — ONDANSETRON 2 MG/ML
4 INJECTION INTRAMUSCULAR; INTRAVENOUS ONCE
Status: COMPLETED | OUTPATIENT
Start: 2020-09-08 | End: 2020-09-08

## 2020-09-08 RX ORDER — METOCLOPRAMIDE HYDROCHLORIDE 5 MG/ML
5 INJECTION INTRAMUSCULAR; INTRAVENOUS EVERY 8 HOURS PRN
Status: DISCONTINUED | OUTPATIENT
Start: 2020-09-08 | End: 2020-09-26

## 2020-09-08 RX ORDER — METOPROLOL TARTRATE 5 MG/5ML
5 INJECTION INTRAVENOUS AS NEEDED
Status: DISCONTINUED | OUTPATIENT
Start: 2020-09-08 | End: 2020-09-26

## 2020-09-08 RX ORDER — METOPROLOL TARTRATE 5 MG/5ML
2.5 INJECTION INTRAVENOUS AS NEEDED
Status: DISCONTINUED | OUTPATIENT
Start: 2020-09-08 | End: 2020-09-26

## 2020-09-08 RX ORDER — ACETAMINOPHEN 10 MG/ML
1000 INJECTION, SOLUTION INTRAVENOUS EVERY 6 HOURS PRN
Status: DISCONTINUED | OUTPATIENT
Start: 2020-09-08 | End: 2020-09-26

## 2020-09-08 RX ORDER — SODIUM CHLORIDE 0.9 % (FLUSH) 0.9 %
3 SYRINGE (ML) INJECTION AS NEEDED
Status: DISCONTINUED | OUTPATIENT
Start: 2020-09-08 | End: 2020-09-26

## 2020-09-08 RX ORDER — HEPARIN SODIUM 5000 [USP'U]/ML
5000 INJECTION, SOLUTION INTRAVENOUS; SUBCUTANEOUS EVERY 12 HOURS SCHEDULED
Status: DISCONTINUED | OUTPATIENT
Start: 2020-09-08 | End: 2020-09-26

## 2020-09-08 RX ORDER — METOPROLOL TARTRATE 5 MG/5ML
5 INJECTION INTRAVENOUS
Status: DISCONTINUED | OUTPATIENT
Start: 2020-09-08 | End: 2020-09-14

## 2020-09-08 RX ORDER — GARLIC EXTRACT 500 MG
1 CAPSULE ORAL 3 TIMES DAILY
Status: DISCONTINUED | OUTPATIENT
Start: 2020-09-08 | End: 2020-09-26

## 2020-09-08 RX ORDER — METOPROLOL TARTRATE 5 MG/5ML
2.5 INJECTION INTRAVENOUS
Status: DISCONTINUED | OUTPATIENT
Start: 2020-09-08 | End: 2020-09-14

## 2020-09-08 RX ORDER — ACETAMINOPHEN 500 MG
1000 TABLET ORAL EVERY 6 HOURS PRN
Status: ON HOLD | COMMUNITY
End: 2020-09-26

## 2020-09-08 RX ORDER — SIMETHICONE 80 MG
80 TABLET,CHEWABLE ORAL 3 TIMES DAILY
Status: DISCONTINUED | OUTPATIENT
Start: 2020-09-08 | End: 2020-09-26

## 2020-09-08 NOTE — H&P
[address] Patient Status:  Emergency    1935 MRN [id number]   Location [facility] Attending Javy Kitchen MD   Hosp Day # 0 PCP Eduardo Connelly     Date:  20 deficits  MSK: Full range of motion in extremities  Skin: Warm and dry  Psych: Normal affect  Ext: no c/c/e     Cervical Papanicolaou to be done in MD's office    Results:     Lab Results   Component Value Date    WBC 12.0 (H) 09/08/2020    HGB 16.0 09/08/ Certification of Need for Inpatient Hospitalization - Initial Certification    Patient will require inpatient services that will reasonably be expected to span two midnight's based on the clinical documentation in H+P.    Based on patients current state of

## 2020-09-08 NOTE — ED NOTES
Discharge instructions given to pt and daughter. Pt and daughter verbalized understanding of home care, medication use, diet, and to follow up with pcp. pt and daughter denied further questions or concerns.  tp discharged to lobby via wheelchair with petros

## 2020-09-08 NOTE — CONSULTS
[facility]    Report of Consultation    Mikayla Dexter Patient Status:  Inpatient    1935 MRN [id number]   Location [facility] 4W/SW/SE Attending Raza Mcfarlane MD   Hosp Day # 0 PCP Marina Norton     Date of Admission:  9 injection 3 mL, 3 mL, Intravenous, PRN  metoprolol Tartrate (LOPRESSOR) 5 MG/5ML injection 5 mg, 5 mg, Intravenous, TID Beta Blocker/Cardiac    Or  metoprolol Tartrate (LOPRESSOR) 5 MG/5ML injection 2.5 mg, 2.5 mg, Intravenous, TID Beta Blocker/Cardiac stop  cephALEXin 500 MG Oral Cap, Take 1 capsule (500 mg total) by mouth 3 (three) times daily. Please take one dose tonight and three doses a day for 4 more days  PEG 3350 Oral Powd Pack, Take 17 g by mouth daily.         Allergies    Codeine 09/08/2020    HGB 16.0 09/08/2020    HCT 49.8 (H) 09/08/2020    .0 09/08/2020    CREATSERUM 1.34 (H) 09/08/2020    BUN 15 09/08/2020     09/08/2020    K 4.0 09/08/2020     09/08/2020    CO2 30.0 09/08/2020     (H) 09/08/2020    CA Multi-infract dementia  History and exam limited  By this      COPD  No therapy  Activity level limited by ankle and mental status        Recommendations:   Bowel rest with NPO, NG, and IVF  Add simethicone (antiflatulent) and lactobacillus (digestant)  If

## 2020-09-08 NOTE — ED PROVIDER NOTES
Patient Seen in: [facility] AND [facility] Emergency Department      History   Patient presents with:  Abdomen/Flank Pain    Stated Complaint: N/ V/ not getting better.  Not getting eating or drinking     HPI    45-year-old female with a doctor elsewhere with a h Eyes: Conjunctivae are normal. Pupils are equal, round, and reactive to light. Cardiovascular: Normal rate, regular rhythm and intact distal pulses. Pulmonary/Chest: Effort normal. No respiratory distress. Abdominal: Soft.   Mild distention and pa Abdomen+pelvis(cpt=74176)    Result Date: 9/7/2020  PROCEDURE:   CT ABDOMEN+PELVIS(CPT=74176)  COMPARISON:   None. INDICATIONS:   Vomiting, decreased appetite.   TECHNIQUE: Axial images of the abdomen and pelvis were obtained without intravenous contrast. Cholecystectomy. Moderate hiatal hernia. Hysterectomy.     Dictated by (CST): Lynn Goncalves MD on 9/07/2020 at 7:03 PM     Finalized by (CST): Lynn Goncalves MD on 9/07/2020 at 7:15 PM          Xr Chest Ap Portable  (cpt=71045)    Result Date: 9/8/ OTHER: Distal nasogastric tube projects in the gastric cardia. CONCLUSION:  1. Slight decrease in the amount of small bowel distension.     Dictated by (CST): Emi Barber MD on 9/08/2020 at 11:48 AM     Finalized by (CST): MAX Barber

## 2020-09-08 NOTE — PLAN OF CARE
Patient received from ED. oriented to self and situation, per daughter this is baseline. Unable to locate pain but states discomfort, given ofirmev. BP elevated, given scheduled metoprolol IV.  NG to LIS, mucousy/yellow/green output, some nausea, given zofr Instruct pt to call for assistance with activity based on assessment  - Modify environment to reduce risk of injury  - Provide assistive devices as appropriate  - Consider OT/PT consult to assist with strengthening/mobility  - Encourage toileting schedule

## 2020-09-08 NOTE — ED NOTES
Orders for admission, patient is aware of plan and ready to go upstairs. Any questions, please call ED SUMANTH Daniel  at extension [phone].    Type of COVID test sent: SARS PCR M200  COVID Suspicion level: Low    LOC at time of transport: Alert and oriented to [facility]

## 2020-09-09 ENCOUNTER — APPOINTMENT (OUTPATIENT)
Dept: GENERAL RADIOLOGY | Facility: HOSPITAL | Age: 85
DRG: 336 | End: 2020-09-09
Attending: HOSPITALIST
Payer: MEDICARE

## 2020-09-09 LAB
ANION GAP SERPL CALC-SCNC: 7 MMOL/L (ref 0–18)
BASOPHILS # BLD AUTO: 0.05 X10(3) UL (ref 0–0.2)
BASOPHILS NFR BLD AUTO: 0.5 %
BUN BLD-MCNC: 18 MG/DL (ref 7–18)
BUN/CREAT SERPL: 12.1 (ref 10–20)
CALCIUM BLD-MCNC: 8.3 MG/DL (ref 8.5–10.1)
CHLORIDE SERPL-SCNC: 109 MMOL/L (ref 98–112)
CO2 SERPL-SCNC: 29 MMOL/L (ref 21–32)
CREAT BLD-MCNC: 1.49 MG/DL (ref 0.55–1.02)
DEPRECATED RDW RBC AUTO: 42.7 FL (ref 35.1–46.3)
EOSINOPHIL # BLD AUTO: 0.02 X10(3) UL (ref 0–0.7)
EOSINOPHIL NFR BLD AUTO: 0.2 %
ERYTHROCYTE [DISTWIDTH] IN BLOOD BY AUTOMATED COUNT: 12.8 % (ref 11–15)
GLUCOSE BLD-MCNC: 89 MG/DL (ref 70–99)
HAV IGM SER QL: 2.1 MG/DL (ref 1.6–2.6)
HCT VFR BLD AUTO: 41.3 % (ref 35–48)
HGB BLD-MCNC: 13.1 G/DL (ref 12–16)
IMM GRANULOCYTES # BLD AUTO: 0.02 X10(3) UL (ref 0–1)
IMM GRANULOCYTES NFR BLD: 0.2 %
LYMPHOCYTES # BLD AUTO: 1.51 X10(3) UL (ref 1–4)
LYMPHOCYTES NFR BLD AUTO: 15.1 %
MCH RBC QN AUTO: 28.7 PG (ref 26–34)
MCHC RBC AUTO-ENTMCNC: 31.7 G/DL (ref 31–37)
MCV RBC AUTO: 90.6 FL (ref 80–100)
MONOCYTES # BLD AUTO: 0.94 X10(3) UL (ref 0.1–1)
MONOCYTES NFR BLD AUTO: 9.4 %
NEUTROPHILS # BLD AUTO: 7.46 X10 (3) UL (ref 1.5–7.7)
NEUTROPHILS # BLD AUTO: 7.46 X10(3) UL (ref 1.5–7.7)
NEUTROPHILS NFR BLD AUTO: 74.6 %
OSMOLALITY SERPL CALC.SUM OF ELEC: 301 MOSM/KG (ref 275–295)
PLATELET # BLD AUTO: 188 10(3)UL (ref 150–450)
POTASSIUM SERPL-SCNC: 3.2 MMOL/L (ref 3.5–5.1)
RBC # BLD AUTO: 4.56 X10(6)UL (ref 3.8–5.3)
SODIUM SERPL-SCNC: 145 MMOL/L (ref 136–145)
WBC # BLD AUTO: 10 X10(3) UL (ref 4–11)

## 2020-09-09 PROCEDURE — 71045 X-RAY EXAM CHEST 1 VIEW: CPT | Performed by: HOSPITALIST

## 2020-09-09 PROCEDURE — 99233 SBSQ HOSP IP/OBS HIGH 50: CPT | Performed by: HOSPITALIST

## 2020-09-09 RX ORDER — POTASSIUM CHLORIDE 14.9 MG/ML
20 INJECTION INTRAVENOUS ONCE
Status: COMPLETED | OUTPATIENT
Start: 2020-09-09 | End: 2020-09-09

## 2020-09-09 RX ORDER — GUAIFENESIN/DEXTROMETHORPHAN 100-10MG/5
100 SYRUP ORAL EVERY 4 HOURS PRN
Status: DISCONTINUED | OUTPATIENT
Start: 2020-09-09 | End: 2020-09-26

## 2020-09-09 NOTE — CM/SW NOTE
9/10 1249pm: SW met with the pt. And her dtr. At bedside. The pt. Was sitting up in bed, has an NG tube, but didn't partake in the conversation. The pt's dtr. Confirmed the pt. Has been staying with her son Denise Lacy and his wife.   Denise Lacy and his wife provide t

## 2020-09-09 NOTE — PROGRESS NOTES
[facility]  Hospitalist Progress Note     Genna Maloney Patient Status:  Inpatient      80year old [facility] [id number]   Location 443/443-A Attending Quita Lamar MD   Hosp Day # 1 PCP Deshawn De La Fuente     ASSESSMENT/PLAN    Small b 202.0 188.0     Recent Labs   Lab 09/07/20  1633 09/08/20  1003 09/09/20  0615   * 149* 89   BUN 14 15 18   CREATSERUM 1.24* 1.34* 1.49*   GFRAA 45* 42* 37*   GFRNAA 39* 36* 32*   CA 9.4 9.6 8.3*   ALB 3.5  --   --     141 145   K 3.8 4.0 3.2*

## 2020-09-09 NOTE — PLAN OF CARE
Problem: Patient Centered Care  Goal: Patient preferences are identified and integrated in the patient's plan of care  Description  Interventions:  - What would you like us to know as we care for you?  Per daughter Reji Butts patient had COVID in the spring a Free from fall injury  Description  INTERVENTIONS:  - Assess pt frequently for physical needs  - Identify cognitive and physical deficits and behaviors that affect risk of falls.   - [place] fall precautions as indicated by assessment.  - Educate pt/famil eating environment  Outcome: Progressing     Patient sleeping comfortably in bed. [facility] for pain control. Call light within reach. Bed low and locked. Heparin subq for DVT ppx. NPO maintained; NG to LIS. Zofran and reglan for nausea.  Beta blocker protoco

## 2020-09-09 NOTE — PLAN OF CARE
Patient oriented to self and situation, daughter at bedside. Unable to locate pain but states intermittent discomfort, given ofirmev. NG to LIS, green output, some nausea, given zofran/reglan.  Tap water enema given, patient unable to follow instructions, o indicated by assessment.  - Educate pt/family on patient safety including physical limitations  - Instruct pt to call for assistance with activity based on assessment  - Modify environment to reduce risk of injury  - Provide assistive devices as appropriat

## 2020-09-09 NOTE — PROGRESS NOTES
[facility]    Progress Note    Blake Leiva Patient Status:  Inpatient    1935 MRN [id number]   Location [facility] 4W/SW/SE Attending Nate Sibley MD   Hosp Day # 1 PCP Dixie Brown     Subjective:     Pt with SBO  Fam distention compared to previous CT, stool in colon  Administer one tap water enema 2/2 stool in colon  Repeat obstructive series in AM  Continue to monitor for return of bowel function, if bowel function does not return or condition worsens, consider surge bowel.  This hernia does not appear to be the site of transition/obstruction of the above described small bowel obstruction. Colonic diverticulosis. 4.6 cm infrarenal abdominal aortic aneurysm. Cholecystectomy. Moderate hiatal hernia. Hysterectomy.

## 2020-09-09 NOTE — PROGRESS NOTES
Patient's family requested visit. Patient in bed with daughter at bedside.  asked patient questions. Patient did not respond. Daughter said she was not happy since she cannot go home.   Daughter was going to call patient's  to see if he wi

## 2020-09-10 ENCOUNTER — APPOINTMENT (OUTPATIENT)
Dept: GENERAL RADIOLOGY | Facility: HOSPITAL | Age: 85
DRG: 336 | End: 2020-09-10
Attending: PHYSICIAN ASSISTANT
Payer: MEDICARE

## 2020-09-10 LAB
ANION GAP SERPL CALC-SCNC: 8 MMOL/L (ref 0–18)
BASOPHILS # BLD AUTO: 0.06 X10(3) UL (ref 0–0.2)
BASOPHILS NFR BLD AUTO: 0.6 %
BUN BLD-MCNC: 14 MG/DL (ref 7–18)
BUN/CREAT SERPL: 14 (ref 10–20)
CALCIUM BLD-MCNC: 8.2 MG/DL (ref 8.5–10.1)
CHLORIDE SERPL-SCNC: 111 MMOL/L (ref 98–112)
CO2 SERPL-SCNC: 28 MMOL/L (ref 21–32)
CREAT BLD-MCNC: 1 MG/DL (ref 0.55–1.02)
DEPRECATED RDW RBC AUTO: 42.3 FL (ref 35.1–46.3)
EOSINOPHIL # BLD AUTO: 0.12 X10(3) UL (ref 0–0.7)
EOSINOPHIL NFR BLD AUTO: 1.3 %
ERYTHROCYTE [DISTWIDTH] IN BLOOD BY AUTOMATED COUNT: 12.7 % (ref 11–15)
GLUCOSE BLD-MCNC: 64 MG/DL (ref 70–99)
GLUCOSE BLDC GLUCOMTR-MCNC: 166 MG/DL (ref 70–99)
GLUCOSE BLDC GLUCOMTR-MCNC: 70 MG/DL (ref 70–99)
HCT VFR BLD AUTO: 39 % (ref 35–48)
HGB BLD-MCNC: 12.5 G/DL (ref 12–16)
IMM GRANULOCYTES # BLD AUTO: 0.03 X10(3) UL (ref 0–1)
IMM GRANULOCYTES NFR BLD: 0.3 %
LYMPHOCYTES # BLD AUTO: 1.37 X10(3) UL (ref 1–4)
LYMPHOCYTES NFR BLD AUTO: 14.4 %
MCH RBC QN AUTO: 29 PG (ref 26–34)
MCHC RBC AUTO-ENTMCNC: 32.1 G/DL (ref 31–37)
MCV RBC AUTO: 90.5 FL (ref 80–100)
MONOCYTES # BLD AUTO: 0.87 X10(3) UL (ref 0.1–1)
MONOCYTES NFR BLD AUTO: 9.1 %
NEUTROPHILS # BLD AUTO: 7.09 X10 (3) UL (ref 1.5–7.7)
NEUTROPHILS # BLD AUTO: 7.09 X10(3) UL (ref 1.5–7.7)
NEUTROPHILS NFR BLD AUTO: 74.3 %
OSMOLALITY SERPL CALC.SUM OF ELEC: 303 MOSM/KG (ref 275–295)
PHOSPHATE SERPL-MCNC: 2.4 MG/DL (ref 2.5–4.9)
PLATELET # BLD AUTO: 200 10(3)UL (ref 150–450)
POTASSIUM SERPL-SCNC: 3.8 MMOL/L (ref 3.5–5.1)
RBC # BLD AUTO: 4.31 X10(6)UL (ref 3.8–5.3)
SODIUM SERPL-SCNC: 147 MMOL/L (ref 136–145)
WBC # BLD AUTO: 9.5 X10(3) UL (ref 4–11)

## 2020-09-10 PROCEDURE — 74019 RADEX ABDOMEN 2 VIEWS: CPT | Performed by: PHYSICIAN ASSISTANT

## 2020-09-10 PROCEDURE — 99232 SBSQ HOSP IP/OBS MODERATE 35: CPT | Performed by: HOSPITALIST

## 2020-09-10 RX ORDER — METOPROLOL TARTRATE 5 MG/5ML
5 INJECTION INTRAVENOUS EVERY 6 HOURS
Status: DISCONTINUED | OUTPATIENT
Start: 2020-09-10 | End: 2020-09-10

## 2020-09-10 RX ORDER — DEXTROSE AND SODIUM CHLORIDE 5; .45 G/100ML; G/100ML
INJECTION, SOLUTION INTRAVENOUS CONTINUOUS
Status: DISCONTINUED | OUTPATIENT
Start: 2020-09-10 | End: 2020-09-26

## 2020-09-10 RX ORDER — DORZOLAMIDE HYDROCHLORIDE AND TIMOLOL MALEATE 20; 5 MG/ML; MG/ML
1 SOLUTION/ DROPS OPHTHALMIC 2 TIMES DAILY
Status: DISCONTINUED | OUTPATIENT
Start: 2020-09-10 | End: 2020-09-26

## 2020-09-10 RX ORDER — LATANOPROST 50 UG/ML
1 SOLUTION/ DROPS OPHTHALMIC NIGHTLY
Status: DISCONTINUED | OUTPATIENT
Start: 2020-09-10 | End: 2020-09-26

## 2020-09-10 RX ORDER — DEXTROSE MONOHYDRATE 25 G/50ML
INJECTION, SOLUTION INTRAVENOUS
Status: COMPLETED
Start: 2020-09-10 | End: 2020-09-10

## 2020-09-10 NOTE — PROGRESS NOTES
[facility]  Hospitalist Progress Note     Vlad Turner Patient Status:  Inpatient      80year old CSN [id number]   Location 443/443-A Attending Kin Lynch MD   Hosp Day # 2 PCP Adriane Burroughs     ASSESSMENT/PLAN    Kim b 09/07/20  1633 09/08/20  1003 09/09/20  0615 09/10/20  0605   * 149* 89 64*   BUN 14 15 18 14   CREATSERUM 1.24* 1.34* 1.49* 1.00   GFRAA 45* 42* 37* 59*   GFRNAA 39* 36* 32* 51*   CA 9.4 9.6 8.3* 8.2*   ALB 3.5  --   --   --     141 145 147*

## 2020-09-10 NOTE — PHYSICAL THERAPY NOTE
PHYSICAL THERAPY EVALUATION - INPATIENT     Room Number: 443/443-A  Evaluation Date: 9/10/2020  Type of Evaluation: Initial   Physician Order: PT Eval and Treat    Presenting Problem: SBO (being treated conservatively); nasuea, vomitting, abdominal pain chat.    Patient will benefit from continued IP PT services to address these deficits in preparation for discharge.     DISCHARGE RECOMMENDATIONS  PT Discharge Recommendations: Home with home health PT;24 hour care/supervision    PLAN  PT Treatment Plan: Be members. A&O x 2 (self, situation) at baseline. Pt needs assistance on stairs, but she rarely goes out.     SUBJECTIVE  \"No\"    PHYSICAL THERAPY EXAMINATION     OBJECTIVE  Precautions: Bed/chair alarm  Fall Risk: High fall risk    WEIGHT BEARING RESTRICTI self-stated goal is: to bring pt home (daughter's goal)   Goal #1 Patient is able to demonstrate supine - sit EOB @ level: minimum assistance x 1     Goal #1   Current Status    Goal #2 Patient is able to demonstrate transfers Sit to/from Stand at [employer]

## 2020-09-10 NOTE — OCCUPATIONAL THERAPY NOTE
OCCUPATIONAL THERAPY EVALUATION - INPATIENT     Room Number: 443/443-A  Evaluation Date: 9/10/2020  Type of Evaluation: Initial  Presenting Problem: SBO    Physician Order: IP Consult to Occupational Therapy  Reason for Therapy: ADL/IADL Dysfunction and Katie Haggis pt required consistent Max A x 2 for bed mobility, SPT, and sit<>Stand from bedside chair. Pt tolerated x 20 seconds of standing with Max A and RW (posterior lean, not able to correct).      At home, she requires one person assist to stand from a chair and LUNG,LOBECTOMY         HOME SITUATION  Type of Home: House  Home Layout: One level  Lives With: Other (Comment)(family, 24 hr hands on)     Toilet and Equipment: Standard height toilet  Shower/Tub and Equipment: Other (Comment)(sponge bathing --dtr unsure (AM-PAC Scale): 29.04  CMS Modifier (G-Code): CL    FUNCTIONAL TRANSFER ASSESSMENT  Supine to Sit : Maximum assistancex2  Sit to Stand: Maximum assistancex2  Chair Transfer: SPT to bedside chair from EOB     Bed mobility > EOB sitting > SPT to bedside brendan

## 2020-09-10 NOTE — PLAN OF CARE
Rafael Shine remains pleasantly confused througout the shift. Daughter at the bedside throughout the shift. Increased oral clear secretions---> MD notified. Zosyn given per order. NG to LIS . No BM throughout the shift.     Problem: Patient Centered Care effects  - Notify MD/LIP if interventions unsuccessful or patient reports new pain  - Anticipate increased pain with activity and pre-medicate as appropriate  Outcome: Progressing     Problem: SAFETY ADULT - FALL  Goal: Free from fall injury  Description appropriate  - Assist with meals as needed  - Monitor I&O, WT and lab values  - Obtain nutritional consult as needed  - Optimize oral hygiene and moisture  - Encourage food from home; allow for food preferences  - Enhance eating environment  Outcome: Progr

## 2020-09-10 NOTE — PAYOR COMM NOTE
--------------  ADMISSION REVIEW     Jayla Garcia [facility]  Subscriber #:  [id number]  Authorization Number: [id number]    Admit date: 9/8/20  Admit time: 1       Admitting Physician: Arzella Saint, MD  Attending Physician:  Serena Edge MD  Primary Ca noted in HPI. Constitutional and vital signs reviewed. All other systems reviewed and negative except as noted above.     Physical Exam     ED Triage Vitals [09/08/20 0913]   /84   Pulse 82   Resp 16   Temp 98.1 °F (36.7 °C)   Temp src Oral   Sp Abnormality         Status                     ---------                               -----------         ------                     CBC W/ DIFFERENTIAL[[id number]]          Abnormal            Final result                 Please vi PELVIC NODES: No adenopathy. PELVIC ORGANS: Uterus is surgically absent. BONES:   Spondylosis lower thoracic and lumbar spine. Mild anterolisthesis L4 on L5. OTHER: Negative.           CONCLUSION:   Mildly dilated small bowel loops concerning for small CT ABDOMEN+PELVIS (CPT=74176), 9/07/2020, 6:22 PM.  INDICATIONS: Nausea and vomiting, not eating or drinking. TECHNIQUE: Supine, prone, and upright (or decubitus) radiographs of the abdomen were performed.    FINDINGS:  BOWEL GAS PATTERN: There has been a 9/8/2020 10:48 AM Date of Service:  9/8/2020 10:38 AM Status:  Addendum    :  Delroy Martinez MD (Physician)    Related Notes:  Original Note by Delroy Martinez MD (Physician) filed at 9/8/2020 10:47 AM         [facility]    Histor m), weight 135 lb (61.2 kg), SpO2 95 %.      Gen: uncomfortable  Pulm: normal respiratory effort  CV: Heart with regular rate and rhythm  Abd: Abdomen soft, nontender at the time of my exam  Neuro: No acute focal deficits  MSK: Full range of motion in extre protocol    COPD  - stable    AAA  - followed as outpt   - currently 4.7cm on imaging     DVT proph: SCDs, heparin sq    Dispo: pending        Vianey Mcdonald MD  3/0/6064    **Certification      PHYSICIAN Certification of Need for Inpatient Hospitalizatio 5000 Units Subcutaneous (Right Upper Arm) Brynn Alberts RN      Metoclopramide HCl (REGLAN) injection 5 mg     Date Action Dose Route User    9/9/2020 2243 Given 5 mg Intravenous Brynn Alberts RN      metoprolol Tartrate (LOPRESSOR) 5 MG/5ML inje morning, passage of flatus is not known. She has not had distention noted.     She has not previously been diagnosed with SBO. She does have a history of abdominal hysterectomy, appendectomy, and cholecystectomy.   She has a history of lung resection for chest pain   GENITAL/: no dysuria, urgency or frequency  GASTRO: no bowel movements, no flatus  MUSCULOSKELETAL: no joint complaints upper or lower extremities,  no leg pain  NEURO: no sensory or motor complaint  Pt is poor historian        Objective: Unknown if she has passed gas. Tolerating NG tube. -General surgery following  -Defer further imaging to general surgery  -NG tube, IV fluids, n.p.o.     Multi-infarct dementia, advanced.   No evidence of delirium  -Reorientation  -Minimize narcotics  -E CO2 32.0 30.0 29.0   ALKPHO 96  --   --    AST 24  --   --    ALT 9*  --   --    BILT 0.9  --   --    TP 8.2  --   --       No results for input(s): PT, INR, PTT in the last 168 hours.     • potassium chloride  20 mEq Intravenous Once   • metoprolol Tart normal deficit. MS: No joint effusions. No peripheral edema. Skin: Skin is warm and dry. No rashes, erythema, diaphoresis. Psych:   Normal mood and affect.  Calm, cooperative     Labs:        Recent Labs   Lab 09/08/20  1003 09/09/20  0615 09/10/20  0

## 2020-09-10 NOTE — PROGRESS NOTES
[place] [facility]    Progress Note    Apurva Hummel Patient Status:  Inpatient    1935 MRN [id number]   Location [facility] 4W/SW/SE Attending Heather Gee MD   Hosp Day # 2 PCP Xi Guthrie     Subjective:     Pt with SBO  Fam series 9/10 with worsening SBO  Continue to monitor for return of bowel function, if bowel function does not return or condition worsens, consider surgery at that time, possibly Saturday if no improvement.    D/w Dr. Nura England who will get cardiology clearance f (cpt=71045)    Result Date: 9/9/2020  CONCLUSION: Bibasilar pulmonary opacities suggesting atelectasis or scar. Otherwise, no acute cardiopulmonary abnormality. Feeding tube is present extending below the diaphragm beyond the field of imaging.    Dictated

## 2020-09-10 NOTE — PLAN OF CARE
Pt alert to self. No signs of discomfort noted. PRN Reglan given for nausea. NG to LIS. NPO IVF. Incontinent of bowel and bladder. One BM so far this shift. Family at bedside. Fall precautions maintained. Call light within reach.     Problem: Patient Center opioid side effects  - Notify MD/LIP if interventions unsuccessful or patient reports new pain  - Anticipate increased pain with activity and pre-medicate as appropriate  Outcome: Progressing     Problem: SAFETY ADULT - FALL  Goal: Free from fall injury  D treat as appropriate  - Assist with meals as needed  - Monitor I&O, WT and lab values  - Obtain nutritional consult as needed  - Optimize oral hygiene and moisture  - Encourage food from home; allow for food preferences  - Enhance eating environment  [employer]

## 2020-09-10 NOTE — CONSULTS
[facility]    PATIENT'S NAME: Hakeem Rojas   ATTENDING PHYSICIAN: Breanne Andrade.  Elysia Nguyen MD   CONSULTING PHYSICIAN: Ovidio Capps DO   PATIENT ACCOUNT#:   [de-identified]    LOCATION:  [address] [place] [address] Drive #:   [id number]       DATE OF BIRTH:  1 Head is atraumatic. No scleral icterus was appreciated. There is an NG tube in place. NECK:  Veins are flat. No carotid bruits appreciated. LUNGS:  Diminished to auscultation anteriorly. Rare rhonchi appreciated. HEART:  S1, S2 regular.   No murmu patient; however, it appears that this is an urgent surgery and must proceed with the surgery as planned. The daughter and son at bedside agree with this plan of care. Dr. Jigar Abdalla, thank you very much for the consult.   Please do not hesitate to call wit

## 2020-09-11 ENCOUNTER — APPOINTMENT (OUTPATIENT)
Dept: CV DIAGNOSTICS | Facility: HOSPITAL | Age: 85
DRG: 336 | End: 2020-09-11
Attending: INTERNAL MEDICINE
Payer: MEDICARE

## 2020-09-11 ENCOUNTER — ANESTHESIA EVENT (OUTPATIENT)
Dept: SURGERY | Facility: HOSPITAL | Age: 85
DRG: 336 | End: 2020-09-11
Payer: MEDICARE

## 2020-09-11 LAB
ANION GAP SERPL CALC-SCNC: 5 MMOL/L (ref 0–18)
BASOPHILS # BLD AUTO: 0.04 X10(3) UL (ref 0–0.2)
BASOPHILS NFR BLD AUTO: 0.4 %
BUN BLD-MCNC: 7 MG/DL (ref 7–18)
BUN/CREAT SERPL: 6.6 (ref 10–20)
CALCIUM BLD-MCNC: 7.9 MG/DL (ref 8.5–10.1)
CHLORIDE SERPL-SCNC: 105 MMOL/L (ref 98–112)
CO2 SERPL-SCNC: 30 MMOL/L (ref 21–32)
CREAT BLD-MCNC: 1.06 MG/DL (ref 0.55–1.02)
DEPRECATED RDW RBC AUTO: 41.5 FL (ref 35.1–46.3)
EOSINOPHIL # BLD AUTO: 0.27 X10(3) UL (ref 0–0.7)
EOSINOPHIL NFR BLD AUTO: 2.7 %
ERYTHROCYTE [DISTWIDTH] IN BLOOD BY AUTOMATED COUNT: 12.6 % (ref 11–15)
GLUCOSE BLD-MCNC: 116 MG/DL (ref 70–99)
HAV IGM SER QL: 1.9 MG/DL (ref 1.6–2.6)
HCT VFR BLD AUTO: 39.8 % (ref 35–48)
HGB BLD-MCNC: 12.8 G/DL (ref 12–16)
IMM GRANULOCYTES # BLD AUTO: 0.03 X10(3) UL (ref 0–1)
IMM GRANULOCYTES NFR BLD: 0.3 %
LYMPHOCYTES # BLD AUTO: 1.48 X10(3) UL (ref 1–4)
LYMPHOCYTES NFR BLD AUTO: 14.5 %
MCH RBC QN AUTO: 29 PG (ref 26–34)
MCHC RBC AUTO-ENTMCNC: 32.2 G/DL (ref 31–37)
MCV RBC AUTO: 90.2 FL (ref 80–100)
MONOCYTES # BLD AUTO: 1.02 X10(3) UL (ref 0.1–1)
MONOCYTES NFR BLD AUTO: 10 %
NEUTROPHILS # BLD AUTO: 7.34 X10 (3) UL (ref 1.5–7.7)
NEUTROPHILS # BLD AUTO: 7.34 X10(3) UL (ref 1.5–7.7)
NEUTROPHILS NFR BLD AUTO: 72.1 %
OSMOLALITY SERPL CALC.SUM OF ELEC: 289 MOSM/KG (ref 275–295)
PHOSPHATE SERPL-MCNC: 1.8 MG/DL (ref 2.5–4.9)
PLATELET # BLD AUTO: 211 10(3)UL (ref 150–450)
POTASSIUM SERPL-SCNC: 3.1 MMOL/L (ref 3.5–5.1)
POTASSIUM SERPL-SCNC: 3.9 MMOL/L (ref 3.5–5.1)
RBC # BLD AUTO: 4.41 X10(6)UL (ref 3.8–5.3)
SODIUM SERPL-SCNC: 140 MMOL/L (ref 136–145)
WBC # BLD AUTO: 10.2 X10(3) UL (ref 4–11)

## 2020-09-11 PROCEDURE — 99233 SBSQ HOSP IP/OBS HIGH 50: CPT | Performed by: HOSPITALIST

## 2020-09-11 PROCEDURE — 93306 TTE W/DOPPLER COMPLETE: CPT | Performed by: INTERNAL MEDICINE

## 2020-09-11 RX ORDER — AMLODIPINE BESYLATE 10 MG/1
10 TABLET ORAL DAILY
Status: DISCONTINUED | OUTPATIENT
Start: 2020-09-11 | End: 2020-09-26

## 2020-09-11 RX ORDER — POTASSIUM CHLORIDE 1.5 G/1.77G
40 POWDER, FOR SOLUTION ORAL EVERY 4 HOURS
Status: DISCONTINUED | OUTPATIENT
Start: 2020-09-11 | End: 2020-09-11

## 2020-09-11 RX ORDER — HYDROMORPHONE HYDROCHLORIDE 1 MG/ML
0.3 INJECTION, SOLUTION INTRAMUSCULAR; INTRAVENOUS; SUBCUTANEOUS ONCE
Status: COMPLETED | OUTPATIENT
Start: 2020-09-11 | End: 2020-09-12

## 2020-09-11 NOTE — PROGRESS NOTES
[place] [facility]  Hospitalist Progress Note     Ramsey Avendano Patient Status:  Inpatient      80year old [facility] [id number]   Location 443/443-A Attending Amanda De La Fuente MD   Hosp Day # 3 PCP Eduardo Beverage     ASSESSMENT/PLAN    Small b tenderness with palpation diffusely  Neuro: Normal reflexes, CN. Sensory/motor exams grossly normal deficit. MS: No joint effusions. No peripheral edema. Skin: Skin is warm and dry. No rashes, erythema, diaphoresis. Psych: Normal mood and affect.  Ricardo guaiFENesin-DM, Normal Saline Flush, metoprolol Tartrate **OR** metoprolol Tartrate, ondansetron HCl, Metoclopramide HCl, acetaminophen  >35min spent, >50% spent counseling and coordinating care in the form of educating pt/family and d/w consultants and st

## 2020-09-11 NOTE — PLAN OF CARE
Patient oriented to self and situation, daughter at bedside.  Unable to locate pain but states intermittent discomfort, given ofirmev, requested pain medication but unavailable to give ofirmev d/t max acetaminophen allotment, Dr Wall Po ordered once dose of IV injury  Description  INTERVENTIONS:  - Assess pt frequently for physical needs  - Identify cognitive and physical deficits and behaviors that affect risk of falls.   - Arthur fall precautions as indicated by assessment.  - Educate pt/family on patient sa environment  Outcome: Progressing

## 2020-09-11 NOTE — PLAN OF CARE
Problem: Patient Centered Care  Goal: Patient preferences are identified and integrated in the patient's plan of care  Description  Interventions:  - What would you like us to know as we care for you?  Per daughter Yolanda Melendez patient had COVID in the spring a Free from fall injury  Description  INTERVENTIONS:  - Assess pt frequently for physical needs  - Identify cognitive and physical deficits and behaviors that affect risk of falls.   - White fall precautions as indicated by assessment.  - Educate pt/famil eating environment  Outcome: Alethia Gilford is alert to self only. Daughter updated on plan of care. Patient denies any pain at this time. NG to LIS, output bile. Patient with copious amounts of clear secretions, suctioned frequently.  Incont of u

## 2020-09-11 NOTE — PROGRESS NOTES
[facility]    Progress Note    Ciara Morgan Patient Status:  Inpatient    1935 MRN [id number]   Location [facility] 4W/SW/SE Attending Aristides Sloan MD   Hosp Day # 3 PCP Xi Bill     Subjective:     Pt with SBO  Fam SBO w/o transition point clearly visualized  Obstructive series 9/8 with slight decrease in bowel distention compared to previous CT, stool in colon  Obstructive series 9/10 with worsening SBO  Pt appears to not be progressing and is still having abd pain 41.3 39.0 39.8   MCV 90.6 90.5 90.2   MCH 28.7 29.0 29.0   MCHC 31.7 32.1 32.2   RDW 12.8 12.7 12.6   NEPRELIM 7.46 7.09 7.34   WBC 10.0 9.5 10.2   .0 200.0 211.0     No results found for: PT, INR    Recent Labs   Lab 09/07/20  1633  09/09/20  0615

## 2020-09-11 NOTE — PROGRESS NOTES
cardioogy progress note    24 h events patient doing well, possible surgery today.     potassium phosphate dibasic 40 mEq in sodium chloride 0.9% 250 mL IVPB, 40 mEq, Intravenous, Once, Yamileth Kim PA-C, Last Rate: 62.5 mL/hr at 09/11/20 1115, 40 mEq PRN, Clarissa Sykes MD    Or  metoprolol Tartrate (LOPRESSOR) 5 MG/5ML injection 2.5 mg, 2.5 mg, Intravenous, PRN, Kirstin Martines MD  Heparin Sodium (Porcine) 5000 UNIT/ML injection 5,000 Units, 5,000 Units, Subcutaneous, 2 times per day, Manuel Kim 188.0 200.0 211.0       Recent Labs   Lab 09/07/20  1633  09/09/20  0615 09/10/20  0605 09/11/20  0609   *   < > 89 64* 116*   BUN 14   < > 18 14 7   CREATSERUM 1.24*   < > 1.49* 1.00 1.06*   GFRAA 45*   < > 37* 59* 55*   GFRNAA 39*   < > 32* 51* 48

## 2020-09-11 NOTE — DIETARY NOTE
ADULT NUTRITION INITIAL ASSESSMENT    Pt is at moderate nutrition risk. Pt does not meet malnutrition criteria. RECOMMENDATIONS TO MD:  See Nutrition Intervention  NPO x3 days, if expect prolonged >7 days, pt may benefit from TPN initiation.   Avail (138 lb)   09/08/20 1411 61.2 kg (134 lb 14.7 oz)   09/08/20 0913 61.2 kg (135 lb)     Wt Readings from Last 10 Encounters:  09/10/20 : 61.8 kg (136 lb 4.8 oz)  03/20/20 : 143#  05/06/19 : 61.2 kg (135 lb)    GASTROINTESTINAL: NGT to LIS, NPO and SBO, surg Accumulation: lower extremity edema per visual exam    - Skin Integrity: at risk. NUTRITION PRESCRIPTION:  Diet: NPO  Oral Supplements: bid when diet allows.     Estimated Nutritional Needs:  Calories: 1550 calories/day (25 calories per kg Current wt)  P

## 2020-09-12 ENCOUNTER — ANESTHESIA (OUTPATIENT)
Dept: SURGERY | Facility: HOSPITAL | Age: 85
DRG: 336 | End: 2020-09-12
Payer: MEDICARE

## 2020-09-12 LAB
ANION GAP SERPL CALC-SCNC: 4 MMOL/L (ref 0–18)
ANTIBODY SCREEN: NEGATIVE
BASOPHILS # BLD AUTO: 0.04 X10(3) UL (ref 0–0.2)
BASOPHILS NFR BLD AUTO: 0.4 %
BUN BLD-MCNC: 5 MG/DL (ref 7–18)
BUN/CREAT SERPL: 4.8 (ref 10–20)
CALCIUM BLD-MCNC: 7.8 MG/DL (ref 8.5–10.1)
CHLORIDE SERPL-SCNC: 107 MMOL/L (ref 98–112)
CO2 SERPL-SCNC: 31 MMOL/L (ref 21–32)
CREAT BLD-MCNC: 1.04 MG/DL (ref 0.55–1.02)
DEPRECATED RDW RBC AUTO: 42.1 FL (ref 35.1–46.3)
EOSINOPHIL # BLD AUTO: 0.46 X10(3) UL (ref 0–0.7)
EOSINOPHIL NFR BLD AUTO: 5 %
ERYTHROCYTE [DISTWIDTH] IN BLOOD BY AUTOMATED COUNT: 12.7 % (ref 11–15)
GLUCOSE BLD-MCNC: 106 MG/DL (ref 70–99)
HAV IGM SER QL: 1.8 MG/DL (ref 1.6–2.6)
HCT VFR BLD AUTO: 39 % (ref 35–48)
HGB BLD-MCNC: 12.5 G/DL (ref 12–16)
IMM GRANULOCYTES # BLD AUTO: 0.03 X10(3) UL (ref 0–1)
IMM GRANULOCYTES NFR BLD: 0.3 %
LYMPHOCYTES # BLD AUTO: 1.73 X10(3) UL (ref 1–4)
LYMPHOCYTES NFR BLD AUTO: 18.9 %
MCH RBC QN AUTO: 28.7 PG (ref 26–34)
MCHC RBC AUTO-ENTMCNC: 32.1 G/DL (ref 31–37)
MCV RBC AUTO: 89.7 FL (ref 80–100)
MONOCYTES # BLD AUTO: 0.75 X10(3) UL (ref 0.1–1)
MONOCYTES NFR BLD AUTO: 8.2 %
NEUTROPHILS # BLD AUTO: 6.16 X10 (3) UL (ref 1.5–7.7)
NEUTROPHILS # BLD AUTO: 6.16 X10(3) UL (ref 1.5–7.7)
NEUTROPHILS NFR BLD AUTO: 67.2 %
OSMOLALITY SERPL CALC.SUM OF ELEC: 292 MOSM/KG (ref 275–295)
PHOSPHATE SERPL-MCNC: 2.5 MG/DL (ref 2.5–4.9)
PLATELET # BLD AUTO: 216 10(3)UL (ref 150–450)
POTASSIUM SERPL-SCNC: 3.3 MMOL/L (ref 3.5–5.1)
RBC # BLD AUTO: 4.35 X10(6)UL (ref 3.8–5.3)
RH BLOOD TYPE: POSITIVE
SARS-COV-2 RNA RESP QL NAA+PROBE: NOT DETECTED
SODIUM SERPL-SCNC: 142 MMOL/L (ref 136–145)
WBC # BLD AUTO: 9.2 X10(3) UL (ref 4–11)

## 2020-09-12 PROCEDURE — 0DSN4ZZ REPOSITION SIGMOID COLON, PERCUTANEOUS ENDOSCOPIC APPROACH: ICD-10-PCS | Performed by: COLON & RECTAL SURGERY

## 2020-09-12 PROCEDURE — 99232 SBSQ HOSP IP/OBS MODERATE 35: CPT | Performed by: HOSPITALIST

## 2020-09-12 PROCEDURE — 0DNN4ZZ RELEASE SIGMOID COLON, PERCUTANEOUS ENDOSCOPIC APPROACH: ICD-10-PCS | Performed by: COLON & RECTAL SURGERY

## 2020-09-12 RX ORDER — PHENYLEPHRINE HCL 10 MG/ML
VIAL (ML) INJECTION AS NEEDED
Status: DISCONTINUED | OUTPATIENT
Start: 2020-09-12 | End: 2020-09-12 | Stop reason: SURG

## 2020-09-12 RX ORDER — MORPHINE SULFATE 10 MG/ML
6 INJECTION, SOLUTION INTRAMUSCULAR; INTRAVENOUS EVERY 10 MIN PRN
Status: DISCONTINUED | OUTPATIENT
Start: 2020-09-12 | End: 2020-09-12 | Stop reason: HOSPADM

## 2020-09-12 RX ORDER — ALBUTEROL SULFATE 2.5 MG/3ML
2.5 SOLUTION RESPIRATORY (INHALATION) ONCE
Status: COMPLETED | OUTPATIENT
Start: 2020-09-12 | End: 2020-09-12

## 2020-09-12 RX ORDER — HYDROMORPHONE HYDROCHLORIDE 1 MG/ML
0.4 INJECTION, SOLUTION INTRAMUSCULAR; INTRAVENOUS; SUBCUTANEOUS EVERY 5 MIN PRN
Status: DISCONTINUED | OUTPATIENT
Start: 2020-09-12 | End: 2020-09-12 | Stop reason: HOSPADM

## 2020-09-12 RX ORDER — NALOXONE HYDROCHLORIDE 0.4 MG/ML
80 INJECTION, SOLUTION INTRAMUSCULAR; INTRAVENOUS; SUBCUTANEOUS AS NEEDED
Status: DISCONTINUED | OUTPATIENT
Start: 2020-09-12 | End: 2020-09-12 | Stop reason: HOSPADM

## 2020-09-12 RX ORDER — ONDANSETRON 2 MG/ML
4 INJECTION INTRAMUSCULAR; INTRAVENOUS ONCE AS NEEDED
Status: DISCONTINUED | OUTPATIENT
Start: 2020-09-12 | End: 2020-09-12 | Stop reason: HOSPADM

## 2020-09-12 RX ORDER — SODIUM CHLORIDE, SODIUM LACTATE, POTASSIUM CHLORIDE, CALCIUM CHLORIDE 600; 310; 30; 20 MG/100ML; MG/100ML; MG/100ML; MG/100ML
INJECTION, SOLUTION INTRAVENOUS CONTINUOUS PRN
Status: DISCONTINUED | OUTPATIENT
Start: 2020-09-12 | End: 2020-09-12 | Stop reason: SURG

## 2020-09-12 RX ORDER — MORPHINE SULFATE 4 MG/ML
4 INJECTION, SOLUTION INTRAMUSCULAR; INTRAVENOUS EVERY 10 MIN PRN
Status: DISCONTINUED | OUTPATIENT
Start: 2020-09-12 | End: 2020-09-12 | Stop reason: HOSPADM

## 2020-09-12 RX ORDER — PROCHLORPERAZINE EDISYLATE 5 MG/ML
5 INJECTION INTRAMUSCULAR; INTRAVENOUS ONCE AS NEEDED
Status: DISCONTINUED | OUTPATIENT
Start: 2020-09-12 | End: 2020-09-12 | Stop reason: HOSPADM

## 2020-09-12 RX ORDER — BUPIVACAINE HYDROCHLORIDE AND EPINEPHRINE 5; 5 MG/ML; UG/ML
INJECTION, SOLUTION PERINEURAL AS NEEDED
Status: DISCONTINUED | OUTPATIENT
Start: 2020-09-12 | End: 2020-09-12 | Stop reason: HOSPADM

## 2020-09-12 RX ORDER — LIDOCAINE HYDROCHLORIDE 10 MG/ML
INJECTION, SOLUTION EPIDURAL; INFILTRATION; INTRACAUDAL; PERINEURAL AS NEEDED
Status: DISCONTINUED | OUTPATIENT
Start: 2020-09-12 | End: 2020-09-12 | Stop reason: SURG

## 2020-09-12 RX ORDER — SODIUM CHLORIDE, SODIUM LACTATE, POTASSIUM CHLORIDE, CALCIUM CHLORIDE 600; 310; 30; 20 MG/100ML; MG/100ML; MG/100ML; MG/100ML
INJECTION, SOLUTION INTRAVENOUS CONTINUOUS
Status: DISCONTINUED | OUTPATIENT
Start: 2020-09-12 | End: 2020-09-12 | Stop reason: HOSPADM

## 2020-09-12 RX ORDER — HALOPERIDOL 5 MG/ML
0.25 INJECTION INTRAMUSCULAR ONCE AS NEEDED
Status: DISCONTINUED | OUTPATIENT
Start: 2020-09-12 | End: 2020-09-12 | Stop reason: HOSPADM

## 2020-09-12 RX ORDER — HYDRALAZINE HYDROCHLORIDE 20 MG/ML
10 INJECTION INTRAMUSCULAR; INTRAVENOUS EVERY 6 HOURS PRN
Status: DISCONTINUED | OUTPATIENT
Start: 2020-09-12 | End: 2020-09-26

## 2020-09-12 RX ORDER — MORPHINE SULFATE 4 MG/ML
2 INJECTION, SOLUTION INTRAMUSCULAR; INTRAVENOUS EVERY 10 MIN PRN
Status: DISCONTINUED | OUTPATIENT
Start: 2020-09-12 | End: 2020-09-12 | Stop reason: HOSPADM

## 2020-09-12 RX ORDER — HYDROMORPHONE HYDROCHLORIDE 1 MG/ML
0.6 INJECTION, SOLUTION INTRAMUSCULAR; INTRAVENOUS; SUBCUTANEOUS EVERY 5 MIN PRN
Status: DISCONTINUED | OUTPATIENT
Start: 2020-09-12 | End: 2020-09-12 | Stop reason: HOSPADM

## 2020-09-12 RX ORDER — METOPROLOL TARTRATE 5 MG/5ML
2.5 INJECTION INTRAVENOUS ONCE
Status: COMPLETED | OUTPATIENT
Start: 2020-09-12 | End: 2020-09-12

## 2020-09-12 RX ORDER — MAGNESIUM SULFATE HEPTAHYDRATE 40 MG/ML
2 INJECTION, SOLUTION INTRAVENOUS ONCE
Status: COMPLETED | OUTPATIENT
Start: 2020-09-12 | End: 2020-09-12

## 2020-09-12 RX ORDER — ROCURONIUM BROMIDE 10 MG/ML
INJECTION, SOLUTION INTRAVENOUS AS NEEDED
Status: DISCONTINUED | OUTPATIENT
Start: 2020-09-12 | End: 2020-09-12 | Stop reason: SURG

## 2020-09-12 RX ORDER — HYDROMORPHONE HYDROCHLORIDE 1 MG/ML
0.2 INJECTION, SOLUTION INTRAMUSCULAR; INTRAVENOUS; SUBCUTANEOUS EVERY 5 MIN PRN
Status: DISCONTINUED | OUTPATIENT
Start: 2020-09-12 | End: 2020-09-12 | Stop reason: HOSPADM

## 2020-09-12 RX ORDER — HYDROMORPHONE HYDROCHLORIDE 1 MG/ML
0.2 INJECTION, SOLUTION INTRAMUSCULAR; INTRAVENOUS; SUBCUTANEOUS EVERY 2 HOUR PRN
Status: DISCONTINUED | OUTPATIENT
Start: 2020-09-12 | End: 2020-09-13

## 2020-09-12 RX ADMIN — SODIUM CHLORIDE, SODIUM LACTATE, POTASSIUM CHLORIDE, CALCIUM CHLORIDE: 600; 310; 30; 20 INJECTION, SOLUTION INTRAVENOUS at 11:01:00

## 2020-09-12 RX ADMIN — SODIUM CHLORIDE, SODIUM LACTATE, POTASSIUM CHLORIDE, CALCIUM CHLORIDE: 600; 310; 30; 20 INJECTION, SOLUTION INTRAVENOUS at 11:40:00

## 2020-09-12 RX ADMIN — ROCURONIUM BROMIDE 20 MG: 10 INJECTION, SOLUTION INTRAVENOUS at 11:34:00

## 2020-09-12 RX ADMIN — PHENYLEPHRINE HCL 100 MCG: 10 MG/ML VIAL (ML) INJECTION at 11:19:00

## 2020-09-12 RX ADMIN — PHENYLEPHRINE HCL 100 MCG: 10 MG/ML VIAL (ML) INJECTION at 11:31:00

## 2020-09-12 RX ADMIN — LIDOCAINE HYDROCHLORIDE 50 MG: 10 INJECTION, SOLUTION EPIDURAL; INFILTRATION; INTRACAUDAL; PERINEURAL at 11:14:00

## 2020-09-12 NOTE — PROGRESS NOTES
[facility]  Hospitalist Progress Note     Rian Chanelle Patient Status:  Inpatient      80year old [facility] [id number]   Location 443/443-A Attending Roman Camp MD   Hosp Day # 4 PCP Neyda Truong     ASSESSMENT/PLAN    Small b 32.2 32.1   RDW 12.7 12.6 12.7   NEPRELIM 7.09 7.34 6.16   WBC 9.5 10.2 9.2   .0 211.0 216.0     Recent Labs   Lab 09/07/20  1633  09/10/20  0605 09/11/20  0609 09/11/20  1755 09/12/20  0618   *   < > 64* 116*  --  106*   BUN 14   < > 14 7  - Tartrate **OR** [MAR Hold] metoprolol Tartrate, [MAR Hold] ondansetron HCl, [MAR Hold] Metoclopramide HCl, [MAR Hold] acetaminophen

## 2020-09-12 NOTE — OPERATIVE REPORT
Operative Note    Patient Name: Genna Maloney    Date of Surgery: 09/12/20     Preoperative Diagnosis: Small bowel obstruction ([address] [K56.609]    Postoperative Diagnosis: same    Primary Surgeon: Melissa Bach    Assistant: YAMILETH Rivas    Procedures: lapa location to allow for maintenance of pneumoperitoneum and later for closure of the fascia at the end of the operation. Additional cannulas were placed with 2 on the left side of the abdomen. There were no adhesions under the lower midline incision.     We t holding.)    _____________________________________   Attending Surgeon: Tiffany Guzman MD   Dictated By: Tiffany Guzman MD

## 2020-09-12 NOTE — PROGRESS NOTES
Patient seen in follow up.  Appears in NAD.   09/12/20  1417   BP: (!) 166/73   Pulse: 68   Resp: 17   Temp: 97.9 °F (36.6 °C)       Intake/Output Summary (Last 24 hours) at 9/12/2020 1553  Last data filed at 9/12/2020 1400  Gross per 24 hour   Intake metoprolol Tartrate (LOPRESSOR) 5 MG/5ML injection 5 mg, 5 mg, Intravenous, PRN    Or  metoprolol Tartrate (LOPRESSOR) 5 MG/5ML injection 2.5 mg, 2.5 mg, Intravenous, PRN  Heparin Sodium (Porcine) 5000 UNIT/ML injection 5,000 Units, 5,000 Units, Subcutaneo .0 09/12/2020    CREATSERUM 1.04 09/12/2020    BUN 5 09/12/2020     09/12/2020    K 3.3 09/12/2020     09/12/2020    CO2 31.0 09/12/2020     09/12/2020    CA 7.8 09/12/2020    MG 1.8 09/12/2020    PHOS 2.5 09/12/2020       IMPRES

## 2020-09-12 NOTE — ANESTHESIA PREPROCEDURE EVALUATION
Anesthesia PreOp Note    HPI:     Maynor Brasher is a 80year old female who presents for preoperative consultation requested by: Danielle Renae MD    Date of Surgery: 9/8/2020 - 9/12/2020    Procedure(s):  LAPAROSCOPIC COLON RESECTION - RIGHT  Indication: (90 Base) MCG/ACT Inhalation Aero Soln, Inhale 1 puff into the lungs every 6 (six) hours while awake., Disp: 1 Inhaler, Rfl: 0  predniSONE 10 MG Oral Tab, Take 4 tablets (40 mg total) by mouth daily.  40 mg po with lunch tomorrow  30 mg po with lunch 3/25,3 Raymundogypsy Rao MD  metoprolol Tartrate (LOPRESSOR) 5 MG/5ML injection 5 mg, 5 mg, Intravenous, TID Beta Blocker/Cardiac, Spike Wang MD, 5 mg at 09/12/20 0620    Or  metoprolol Tartrate (LOPRESSOR) 5 MG/5ML injection 2.5 mg, 2.5 mg, Intravenous, TID Beta Bl Needs      Financial resource strain: Not on file      Food insecurity:        Worry: Not on file        Inability: Not on file      Transportation needs:        Medical: Not on file        Non-medical: Not on file    Tobacco Use      Smoking status: Forme respiration is 20 and oxygen saturation is 96%.    09/11/20 2207 09/11/20 2242 09/11/20 2245 09/12/20  0602   BP: 160/86  (!) 161/90 149/72   Pulse: 93  80 84   Resp:    20   Temp:    98.4 °F (36.9 °C)   TempSrc:    Oral   SpO2: 93% 93%  96%   Weight:

## 2020-09-12 NOTE — ANESTHESIA POSTPROCEDURE EVALUATION
Patient: Mikayla Dexter    Procedure Summary     Date:  09/12/20 Room / Location:  [address] [place] [address] MAIN OR 06 / [address] [place] [address] MAIN OR    Anesthesia Start:  1101 Anesthesia Stop:      Procedure:  LAPAROSCOPIC LYSIS OF ADHESIONS (N/A ) Diagnosis:       Small bowel obstruction (H

## 2020-09-12 NOTE — ANESTHESIA PROCEDURE NOTES
Airway  Date/Time: 9/12/2020 11:16 AM  Urgency: Elective    Airway not difficult    General Information and Staff    Patient location during procedure: OR  Anesthesiologist: Ramone López MD  Performed: anesthesiologist     Indications and Patient Co

## 2020-09-12 NOTE — PLAN OF CARE
Patient A&Ox1-2. Room air. Left NG to LIS. Tele in place, no calls received. IVF infusing. IV Zosyn. Pain being managed with ofermiv. NPO. Suctioning prn for excessive secretions. New IV placed in left forearm. Daily weight. Incontinent to bowel & urine. and pain management  - Manage/alleviate anxiety  - Utilize distraction and/or relaxation techniques  - Monitor for opioid side effects  - Notify MD/LIP if interventions unsuccessful or patient reports new pain  - Anticipate increased pain with activity and (undernourished)  Description  INTERVENTIONS:  - Monitor percentage of each meal consumed  - Identify factors contributing to decreased intake, treat as appropriate  - Assist with meals as needed  - Monitor I&O, WT and lab values  - Obtain nutritional cons

## 2020-09-12 NOTE — PLAN OF CARE
Pt had surgery with Dr. Cookie Bustamante today. NG to LIS. 3 lap sites to abdomen. Young in place. IVF infusing. Zosyn given. Potassium and magnesium covered per protocol. Pt is confused. Family at bedside.    Problem: Patient Centered Care  Goal: Patient preferences if interventions unsuccessful or patient reports new pain  - Anticipate increased pain with activity and pre-medicate as appropriate  Outcome: Progressing     Problem: SAFETY ADULT - FALL  Goal: Free from fall injury  Description  INTERVENTIONS:  - Assess meals as needed  - Monitor I&O, WT and lab values  - Obtain nutritional consult as needed  - Optimize oral hygiene and moisture  - Encourage food from home; allow for food preferences  - Enhance eating environment  Outcome: Progressing

## 2020-09-13 LAB
HAV IGM SER QL: 2.4 MG/DL (ref 1.6–2.6)
POTASSIUM SERPL-SCNC: 3.6 MMOL/L (ref 3.5–5.1)

## 2020-09-13 PROCEDURE — 99233 SBSQ HOSP IP/OBS HIGH 50: CPT | Performed by: HOSPITALIST

## 2020-09-13 RX ORDER — DIPHENHYDRAMINE HYDROCHLORIDE 50 MG/ML
25 INJECTION INTRAMUSCULAR; INTRAVENOUS EVERY 4 HOURS PRN
Status: DISCONTINUED | OUTPATIENT
Start: 2020-09-13 | End: 2020-09-26

## 2020-09-13 RX ORDER — HYDROMORPHONE HYDROCHLORIDE 1 MG/ML
0.5 INJECTION, SOLUTION INTRAMUSCULAR; INTRAVENOUS; SUBCUTANEOUS
Status: DISCONTINUED | OUTPATIENT
Start: 2020-09-13 | End: 2020-09-26

## 2020-09-13 RX ORDER — KETOROLAC TROMETHAMINE 15 MG/ML
15 INJECTION, SOLUTION INTRAMUSCULAR; INTRAVENOUS EVERY 6 HOURS PRN
Status: DISPENSED | OUTPATIENT
Start: 2020-09-13 | End: 2020-09-15

## 2020-09-13 RX ORDER — KETOROLAC TROMETHAMINE 15 MG/ML
15 INJECTION, SOLUTION INTRAMUSCULAR; INTRAVENOUS ONCE
Status: COMPLETED | OUTPATIENT
Start: 2020-09-13 | End: 2020-09-15

## 2020-09-13 NOTE — PLAN OF CARE
Pain controlled with IV Tylenol and IV dilaudid. No signs of allergic reaction with IV dilaudid. PRN Benadryl available. Frequent suctioning provided by family. Potassium covered per protocol. Yonug in place. On 1 liter of oxygen. Zosyn given.  Heparin for Consider cultural and social influences on pain and pain management  - Manage/alleviate anxiety  - Utilize distraction and/or relaxation techniques  - Monitor for opioid side effects  - Notify MD/LIP if interventions unsuccessful or patient reports new willow nutritional intake (undernourished)  Description  INTERVENTIONS:  - Monitor percentage of each meal consumed  - Identify factors contributing to decreased intake, treat as appropriate  - Assist with meals as needed  - Monitor I&O, WT and lab values  - Obta

## 2020-09-13 NOTE — PROGRESS NOTES
[facility] AND [facility]  Progress Note    Giovanny Ellis Patient Status:  Inpatient    1935 MRN [id number]   Location [facility] 4W/SW/SE Attending Alba Daniels MD   Hosp Day # 5 PCP Lisa Ramirez   Seen with Jeny Barnes NP  Subjective:  Julisa Camacho bilateral pedal and radial sites. Neurologic: Alert and oriented, normal affect. Skin: Warm and dry. Laboratory/Data:  Diagnostics:     Echo 9/10/20  Study Conclusions  1. Left ventricle:  The cavity size was normal. Wall thickness was     increased i 3.375 g in dextrose 5 % 100 mL ADD-vantage, 3.375 g, Intravenous, Q8H  Pantoprazole Sodium (PROTONIX) 40 mg in Sodium Chloride 0.9 % 10 mL IV push, 40 mg, Intravenous, Daily  Dorzolamide HCl-Timolol Mal (Dorzolamide-Timolol) 22.3-6.8 MG/ML ophthalmic solut [facility]  [address] [facility] [address]  [facility]  [phone]

## 2020-09-13 NOTE — PROGRESS NOTES
[place] [facility] - [facility]  Hospitalist Progress Note     Minnie Fear Patient Status:  Inpatient      80year old [facility] [id number]   Location 443/443-A Attending Mary Carmen Betancur MD   Hosp Day # 5 PCP Yoav Eagle     ASSESSMENT/PLAN    Kim powell tenderness with palpation diffusely  Neuro: Normal reflexes, CN. Sensory/motor exams grossly normal deficit. MS: No joint effusions. No peripheral edema. Skin: Skin is warm and dry. No rashes, erythema, diaphoresis. Psych: Normal mood and affect.  Ricardo Blocker/Cardiac    Or   • metoprolol tartrate  25 mg Oral TID Beta Blocker/Cardiac   • Heparin Sodium (Porcine)  5,000 Units Subcutaneous 2 times per day   • Acidophilus/Pectin  1 capsule Oral TID   • simethicone  80 mg Oral TID     diphenhydrAMINE HCl, hy

## 2020-09-13 NOTE — PLAN OF CARE
Problem: Patient Centered Care  Goal: Patient preferences are identified and integrated in the patient's plan of care  Description  Interventions:  - What would you like us to know as we care for you?  Per daughter Macho Barrientos patient had COVID in the spring a Free from fall injury  Description  INTERVENTIONS:  - Assess pt frequently for physical needs  - Identify cognitive and physical deficits and behaviors that affect risk of falls.   - [place] fall precautions as indicated by assessment.  - Educate pt/famil eating environment  Outcome: Progressing     Patient sleeping comfortably in bed. Ofirmev and dilaudid ivp for pain control. Call light within reach. Bed low and locked. Heparin subq for DVT ppx. NPO maintained. NG to LIS. Meds through NGT.  Guaifenesin for

## 2020-09-13 NOTE — PROGRESS NOTES
[facility]    Progress Note    Padma Rodriguez Patient Status:  Inpatient    1935 MRN [id number]   Location [facility] 4W/SW/SE Attending Meryle Blood, MD   [facility] Day # 5 PCP Maryellen Tamayo        Subjective:   Padma Rodriguez i mL IV push, 40 mg, Intravenous, Daily  Dorzolamide HCl-Timolol Mal (Dorzolamide-Timolol) 22.3-6.8 MG/ML ophthalmic solution 1 drop, 1 drop, Both Eyes, BID  latanoprost (XALATAN) 0.005 % ophthalmic solution 1 drop, 1 drop, Both Eyes, Nightly  [COMPLETED] po Signs: Blood pressure 120/61, pulse 71, temperature 98.8 °F (37.1 °C), temperature source Axillary, resp. rate 18, height 5' 5\" (1.651 m), weight 139 lb 1.6 oz (63.1 kg), SpO2 97 %, not currently breastfeeding. I/O last 3 completed shifts: In: 6323.

## 2020-09-14 ENCOUNTER — APPOINTMENT (OUTPATIENT)
Dept: GENERAL RADIOLOGY | Facility: HOSPITAL | Age: 85
DRG: 336 | End: 2020-09-14
Attending: COLON & RECTAL SURGERY
Payer: MEDICARE

## 2020-09-14 ENCOUNTER — APPOINTMENT (OUTPATIENT)
Dept: GENERAL RADIOLOGY | Facility: HOSPITAL | Age: 85
DRG: 336 | End: 2020-09-14
Attending: HOSPITALIST
Payer: MEDICARE

## 2020-09-14 LAB
ANION GAP SERPL CALC-SCNC: 3 MMOL/L (ref 0–18)
BUN BLD-MCNC: 4 MG/DL (ref 7–18)
BUN/CREAT SERPL: 3.8 (ref 10–20)
CALCIUM BLD-MCNC: 8.4 MG/DL (ref 8.5–10.1)
CHLORIDE SERPL-SCNC: 110 MMOL/L (ref 98–112)
CO2 SERPL-SCNC: 31 MMOL/L (ref 21–32)
CREAT BLD-MCNC: 1.06 MG/DL (ref 0.55–1.02)
DEPRECATED RDW RBC AUTO: 43.1 FL (ref 35.1–46.3)
ERYTHROCYTE [DISTWIDTH] IN BLOOD BY AUTOMATED COUNT: 13 % (ref 11–15)
GLUCOSE BLD-MCNC: 101 MG/DL (ref 70–99)
HCT VFR BLD AUTO: 42.2 % (ref 35–48)
HGB BLD-MCNC: 13.5 G/DL (ref 12–16)
MCH RBC QN AUTO: 29.1 PG (ref 26–34)
MCHC RBC AUTO-ENTMCNC: 32 G/DL (ref 31–37)
MCV RBC AUTO: 90.9 FL (ref 80–100)
OSMOLALITY SERPL CALC.SUM OF ELEC: 295 MOSM/KG (ref 275–295)
PLATELET # BLD AUTO: 278 10(3)UL (ref 150–450)
POTASSIUM SERPL-SCNC: 3.7 MMOL/L (ref 3.5–5.1)
RBC # BLD AUTO: 4.64 X10(6)UL (ref 3.8–5.3)
SODIUM SERPL-SCNC: 144 MMOL/L (ref 136–145)
WBC # BLD AUTO: 8 X10(3) UL (ref 4–11)

## 2020-09-14 PROCEDURE — 71045 X-RAY EXAM CHEST 1 VIEW: CPT | Performed by: COLON & RECTAL SURGERY

## 2020-09-14 PROCEDURE — 71045 X-RAY EXAM CHEST 1 VIEW: CPT | Performed by: HOSPITALIST

## 2020-09-14 PROCEDURE — 74018 RADEX ABDOMEN 1 VIEW: CPT | Performed by: HOSPITALIST

## 2020-09-14 PROCEDURE — 99232 SBSQ HOSP IP/OBS MODERATE 35: CPT | Performed by: HOSPITALIST

## 2020-09-14 RX ORDER — POTASSIUM CHLORIDE 14.9 MG/ML
20 INJECTION INTRAVENOUS ONCE
Status: COMPLETED | OUTPATIENT
Start: 2020-09-14 | End: 2020-09-14

## 2020-09-14 NOTE — PROGRESS NOTES
[facility]  Progress Note    Elease Ramp Patient Status:  Inpatient    1935 MRN [id number]   Location [facility] 4W/SW/SE Attending Melanie Milner MD   [facility] Day # 6 PCP Jose Falk     Subjective:  Patient is resting in bed.   Lisa Wisdom sites.  Neurologic: Alert and oriented, normal affect. Skin: Warm and dry. Laboratory/Data:  Diagnostics:     Echo 9/10/20  Study Conclusions  1. Left ventricle: The cavity size was normal. Wall thickness was     increased in a pattern of mild LVH.  Sy mg, 10 mg, Oral, Daily  dextrose 5 %-0.45 % NaCl infusion, , Intravenous, Continuous  Piperacillin Sod-Tazobactam So (ZOSYN) 3.375 g in dextrose 5 % 100 mL ADD-vantage, 3.375 g, Intravenous, Q8H  Pantoprazole Sodium (PROTONIX) 40 mg in Sodium Chloride 0.9 through NG tube, IV metoprolol    Plan: Bps better will continue with amlodipine. Thank you for allowing me to participate in the care of your patient. please call if you have any question.      Luis Alfredo Castellanos MD   General Cardiology & Advanced Heart Fail

## 2020-09-14 NOTE — PLAN OF CARE
Gaurav Ashford remains pleasantly confused througout the shift. Daughter at the bedside throughout the shift. Patient appears to be more alert today. Oral secretions decreased this afternoon. NG removed by patient.  Per MD ok to leave out as long as patient influences on pain and pain management  - Manage/alleviate anxiety  - Utilize distraction and/or relaxation techniques  - Monitor for opioid side effects  - Notify MD/LIP if interventions unsuccessful or patient reports new pain  - Anticipate increased willow (undernourished)  Description  INTERVENTIONS:  - Monitor percentage of each meal consumed  - Identify factors contributing to decreased intake, treat as appropriate  - Assist with meals as needed  - Monitor I&O, WT and lab values  - Obtain nutritional cons

## 2020-09-14 NOTE — PROGRESS NOTES
[facility]    Progress Note    Arabella Calzada Patient Status:  Inpatient    1935 MRN [id number]   Location [facility] 4W/SW/SE Attending Antione Muhammad MD   [facility] Day # 6 PCP Silvino Rangel        Subjective:   Arabella Calzada i Intravenous, Continuous  Piperacillin Sod-Tazobactam So (ZOSYN) 3.375 g in dextrose 5 % 100 mL ADD-vantage, 3.375 g, Intravenous, Q8H  Pantoprazole Sodium (PROTONIX) 40 mg in Sodium Chloride 0.9 % 10 mL IV push, 40 mg, Intravenous, Daily  Dorzolamide HCl-T breastfeeding. I/O last 3 completed shifts: In: 2808.1 [I.V.:2808.1]  Out: 3350 [Urine:2525; Emesis/NG output:825]    General: No acute distress. Alert. HEENT: Moist mucous membranes. Abdomen: Soft, nontender, nondistended. Positive bowel sounds. Slight increase left basilar parenchymal abnormality with small left-sided effusion. Acute pneumonitis on a background of chronic scarring cannot be excluded. Follow-up to resolution.  4. Moderate retrocardiac hiatal hernia    Dictated by (CST): Andrew Caicedo

## 2020-09-14 NOTE — PROGRESS NOTES
[place] [facility] - [facility]  Hospitalist Progress Note     Tori De Souza Patient Status:  Inpatient      80year old CSN [id number]   Location 443/443-A Attending Neri Isaac MD   Hosp Day # 6 PCP [id number] [facility]           Attending Addendum sig distress. Answers simple questions appropriately  HEENT: NCAT, neck supple, no carotid bruit. NG tube in place  CV: RRR, S1S2, and intact distal pulses. No gallop, rub, murmur. Pulm: Effort and breath sounds normal. No distress, wheezes, rales, rhonchi. (PROTONIX) IV push  40 mg Intravenous Daily   • Dorzolamide HCl-Timolol Mal  1 drop Both Eyes BID   • latanoprost  1 drop Both Eyes Nightly   • metoprolol Tartrate  5 mg Intravenous TID Beta Blocker/Cardiac    Or   • metoprolol Tartrate  2.5 mg Intravenous

## 2020-09-14 NOTE — PLAN OF CARE
Patient pleasantly confused. Vitals stable. IV Tylenol PRN for pain. NG to LIS. NPO. Oral suction frequently for oral secretions. Lap sites CDI. Max assist. Repositioned as tolerated.      Patient NG tube found out of pts nose this AM, per MD request, MAXIM marin anxiety  - Utilize distraction and/or relaxation techniques  - Monitor for opioid side effects  - Notify MD/LIP if interventions unsuccessful or patient reports new pain  - Anticipate increased pain with activity and pre-medicate as appropriate  Outcome: P each meal consumed  - Identify factors contributing to decreased intake, treat as appropriate  - Assist with meals as needed  - Monitor I&O, WT and lab values  - Obtain nutritional consult as needed  - Optimize oral hygiene and moisture  - Encourage food f

## 2020-09-15 LAB
ANION GAP SERPL CALC-SCNC: 4 MMOL/L (ref 0–18)
BUN BLD-MCNC: 3 MG/DL (ref 7–18)
BUN/CREAT SERPL: 3.2 (ref 10–20)
CALCIUM BLD-MCNC: 8.5 MG/DL (ref 8.5–10.1)
CHLORIDE SERPL-SCNC: 107 MMOL/L (ref 98–112)
CO2 SERPL-SCNC: 31 MMOL/L (ref 21–32)
CREAT BLD-MCNC: 0.94 MG/DL (ref 0.55–1.02)
GLUCOSE BLD-MCNC: 98 MG/DL (ref 70–99)
HAV IGM SER QL: 1.9 MG/DL (ref 1.6–2.6)
OSMOLALITY SERPL CALC.SUM OF ELEC: 291 MOSM/KG (ref 275–295)
POTASSIUM SERPL-SCNC: 3.2 MMOL/L (ref 3.5–5.1)
SODIUM SERPL-SCNC: 142 MMOL/L (ref 136–145)

## 2020-09-15 PROCEDURE — 99232 SBSQ HOSP IP/OBS MODERATE 35: CPT | Performed by: HOSPITALIST

## 2020-09-15 RX ORDER — KETOROLAC TROMETHAMINE 15 MG/ML
INJECTION, SOLUTION INTRAMUSCULAR; INTRAVENOUS
Status: COMPLETED
Start: 2020-09-15 | End: 2020-09-15

## 2020-09-15 RX ORDER — POTASSIUM CHLORIDE 14.9 MG/ML
20 INJECTION INTRAVENOUS ONCE
Status: COMPLETED | OUTPATIENT
Start: 2020-09-15 | End: 2020-09-15

## 2020-09-15 NOTE — PHYSICAL THERAPY NOTE
Attempted treatment pt sleeping in the chair and daughter was present. Pt's daughter asked to let the pt sleep today due to having not slept much through the night. Will follow up tomorrow. RN aware.

## 2020-09-15 NOTE — PROGRESS NOTES
[facility]    Progress Note    Tarik Watts Patient Status:  Inpatient    1935 MRN [id number]   Location [facility] 4W/SW/SE Attending Fernie Higginbotham MD   [facility] Day # 7 PCP Jason Victoria        Subjective:   Tarik Watts i Intravenous, Once  amLODIPine Besylate (NORVASC) tab 10 mg, 10 mg, Oral, Daily  [COMPLETED] HYDROmorphone HCl (DILAUDID) 1 MG/ML injection 0.3 mg, 0.3 mg, Intravenous, Once  [COMPLETED] dextrose 50 % injection, , ,   dextrose 5 %-0.45 % NaCl infusion, , In tab 80 mg, 80 mg, Oral, TID            Objective:   Vital Signs:  Blood pressure 135/75, pulse 72, temperature 96.9 °F (36.1 °C), temperature source Oral, resp. rate 18, height 65\", weight 139 lb 5.3 oz (63.2 kg), SpO2 98 %, not currently breastfeeding. Suresh Deras MD on 9/14/2020 at 8:43 AM     Finalized by (CST): Suresh Deras MD on 9/14/2020 at 8:50 AM          Xr Chest Ap Portable  (cpt=71045)    Result Date: 9/14/2020  CONCLUSION:  1. Borderline cardiomegaly.   Tortuous atherosclerotic a

## 2020-09-15 NOTE — PROGRESS NOTES
[place] [facility] - [facility]  Hospitalist Progress Note     Arabella Calzada Patient Status:  Inpatient      80year old [facility] [id number]   Location 443/443-A Attending Fredis Krishnamurthy MD   Hosp Day # 7 PCP [id number] [facility]           Attending Addendum sig 135/75  Gen: A+Ox1. No distress. lethargic  HEENT: NCAT, neck supple, no carotid bruit. CV: RRR, S1S2, and intact distal pulses. No gallop, rub, murmur. Pulm: Effort and breath sounds normal. No distress, wheezes, rales, rhonchi.   Abd: Soft, there appe hydrALAzine HCl, phenol, guaiFENesin-DM, Normal Saline Flush, metoprolol Tartrate **OR** metoprolol Tartrate, ondansetron HCl, Metoclopramide HCl, acetaminophen     DAKOTA García  9/15/2020

## 2020-09-15 NOTE — PLAN OF CARE
Radha Roque remains pleasantly confused througout the shift. Daughter at the bedside throughout the shift. Patient appears to be lethargic today. Oral secretions are clear. Patient NPO but can receive ice chips. IV tylenol given as needed for pain. Manage/alleviate anxiety  - Utilize distraction and/or relaxation techniques  - Monitor for opioid side effects  - Notify MD/LIP if interventions unsuccessful or patient reports new pain  - Anticipate increased pain with activity and pre-medicate as approp Monitor percentage of each meal consumed  - Identify factors contributing to decreased intake, treat as appropriate  - Assist with meals as needed  - Monitor I&O, WT and lab values  - Obtain nutritional consult as needed  - Optimize oral hygiene and moistu

## 2020-09-15 NOTE — PLAN OF CARE
Patient is alert but confused and forgetful. Vital signs stable. Remote tele. No calls. NPO w/ sips of meds. Toradol and ofirmev for pain control. Denies nausea. No vomiting. Ambulating max assist w/ lift. Young in place. IVF infusing.  Zosyn for antibiotic evaluate response  - Consider cultural and social influences on pain and pain management  - Manage/alleviate anxiety  - Utilize distraction and/or relaxation techniques  - Monitor for opioid side effects  - Notify MD/LIP if interventions unsuccessful or pa Maintains adequate nutritional intake (undernourished)  Description  INTERVENTIONS:  - Monitor percentage of each meal consumed  - Identify factors contributing to decreased intake, treat as appropriate  - Assist with meals as needed  - Monitor I&O, WT and

## 2020-09-15 NOTE — PROGRESS NOTES
[facility] AND CLINICS  Progress Note    Jeancarlos Seay Patient Status:  Inpatient    1935 MRN [id number]   Location [facility] 4W/SW/SE Attending Tonny Reyez MD   Hosp Day # 7 PCP Missy Lora     Subjective:  Patient seen in follow up.   Sh 2+ bilateral pedal and radial sites. Neurologic: Alert and oriented, normal affect. Skin: Warm and dry. Laboratory/Data:  Diagnostics:     Echo 9/10/20  Study Conclusions  1. Left ventricle:  The cavity size was normal. Wall thickness was     increase % NaCl infusion, , Intravenous, Continuous  Piperacillin Sod-Tazobactam So (ZOSYN) 3.375 g in dextrose 5 % 100 mL ADD-vantage, 3.375 g, Intravenous, Q8H  Pantoprazole Sodium (PROTONIX) 40 mg in Sodium Chloride 0.9 % 10 mL IV push, 40 mg, Intravenous, Daily

## 2020-09-16 ENCOUNTER — APPOINTMENT (OUTPATIENT)
Dept: PICC SERVICES | Facility: HOSPITAL | Age: 85
DRG: 336 | End: 2020-09-16
Attending: HOSPITALIST
Payer: MEDICARE

## 2020-09-16 LAB
ANION GAP SERPL CALC-SCNC: 5 MMOL/L (ref 0–18)
BASOPHILS # BLD AUTO: 0.06 X10(3) UL (ref 0–0.2)
BASOPHILS NFR BLD AUTO: 1 %
BUN BLD-MCNC: 5 MG/DL (ref 7–18)
BUN/CREAT SERPL: 5.2 (ref 10–20)
CALCIUM BLD-MCNC: 8.7 MG/DL (ref 8.5–10.1)
CHLORIDE SERPL-SCNC: 106 MMOL/L (ref 98–112)
CO2 SERPL-SCNC: 29 MMOL/L (ref 21–32)
CREAT BLD-MCNC: 0.96 MG/DL (ref 0.55–1.02)
DEPRECATED RDW RBC AUTO: 43.3 FL (ref 35.1–46.3)
EOSINOPHIL # BLD AUTO: 0.68 X10(3) UL (ref 0–0.7)
EOSINOPHIL NFR BLD AUTO: 11 %
ERYTHROCYTE [DISTWIDTH] IN BLOOD BY AUTOMATED COUNT: 13.1 % (ref 11–15)
GLUCOSE BLD-MCNC: 100 MG/DL (ref 70–99)
HAV IGM SER QL: 2 MG/DL (ref 1.6–2.6)
HCT VFR BLD AUTO: 43.4 % (ref 35–48)
HGB BLD-MCNC: 13.8 G/DL (ref 12–16)
IMM GRANULOCYTES # BLD AUTO: 0.01 X10(3) UL (ref 0–1)
IMM GRANULOCYTES NFR BLD: 0.2 %
LYMPHOCYTES # BLD AUTO: 1.61 X10(3) UL (ref 1–4)
LYMPHOCYTES NFR BLD AUTO: 26.1 %
MCH RBC QN AUTO: 28.7 PG (ref 26–34)
MCHC RBC AUTO-ENTMCNC: 31.8 G/DL (ref 31–37)
MCV RBC AUTO: 90.2 FL (ref 80–100)
MONOCYTES # BLD AUTO: 0.55 X10(3) UL (ref 0.1–1)
MONOCYTES NFR BLD AUTO: 8.9 %
NEUTROPHILS # BLD AUTO: 3.27 X10 (3) UL (ref 1.5–7.7)
NEUTROPHILS # BLD AUTO: 3.27 X10(3) UL (ref 1.5–7.7)
NEUTROPHILS NFR BLD AUTO: 52.8 %
OSMOLALITY SERPL CALC.SUM OF ELEC: 287 MOSM/KG (ref 275–295)
PHOSPHATE SERPL-MCNC: 2.3 MG/DL (ref 2.5–4.9)
PLATELET # BLD AUTO: 336 10(3)UL (ref 150–450)
POTASSIUM SERPL-SCNC: 3.6 MMOL/L (ref 3.5–5.1)
RBC # BLD AUTO: 4.81 X10(6)UL (ref 3.8–5.3)
SODIUM SERPL-SCNC: 140 MMOL/L (ref 136–145)
WBC # BLD AUTO: 6.2 X10(3) UL (ref 4–11)

## 2020-09-16 PROCEDURE — 3E0436Z INTRODUCTION OF NUTRITIONAL SUBSTANCE INTO CENTRAL VEIN, PERCUTANEOUS APPROACH: ICD-10-PCS | Performed by: HOSPITALIST

## 2020-09-16 PROCEDURE — 02HV33Z INSERTION OF INFUSION DEVICE INTO SUPERIOR VENA CAVA, PERCUTANEOUS APPROACH: ICD-10-PCS | Performed by: HOSPITALIST

## 2020-09-16 PROCEDURE — 99233 SBSQ HOSP IP/OBS HIGH 50: CPT | Performed by: HOSPITALIST

## 2020-09-16 RX ORDER — ALBUTEROL SULFATE 2.5 MG/3ML
2.5 SOLUTION RESPIRATORY (INHALATION)
Status: DISCONTINUED | OUTPATIENT
Start: 2020-09-16 | End: 2020-09-26

## 2020-09-16 NOTE — PLAN OF CARE
Discussed plan of care for day, including all given medications and their indications. NG tube no longer in place but still with conservative measures per Dr. Silverio Higginbotham for oral intake -- may only have meds with sips of water.   PICC line placed this afternoon request assistance  - Assess pain using appropriate pain scale  - Administer analgesics based on type and severity of pain and evaluate response  - Implement non-pharmacological measures as appropriate and evaluate response  - Consider cultural and social mobilization and activity  - Obtain nutritional consult as needed  - Establish a toileting routine/schedule  - Consider collaborating with pharmacy to review patient's medication profile  Outcome: Progressing  Goal: Maintains adequate nutritional intake (u

## 2020-09-16 NOTE — PROGRESS NOTES
[facility]    Progress Note    Davied Clonts Patient Status:  Inpatient    1935 MRN [id number]   Location [facility] 4W/SW/SE Attending Tai Triplett MD   [facility] Day # 8 PCP Dax Hamlin        Subjective:   Librado Mancillats i Q6H PRN  [COMPLETED] potassium phosphate dibasic 40 mEq in sodium chloride 0.9% 250 mL IVPB, 40 mEq, Intravenous, Once  amLODIPine Besylate (NORVASC) tab 10 mg, 10 mg, Oral, Daily  [COMPLETED] HYDROmorphone HCl (DILAUDID) 1 MG/ML injection 0.3 mg, 0.3 mg, PRN  Acidophilus/Pectin (PROBIOTIC) CAPS 1 capsule, 1 capsule, Oral, TID  simethicone (MYLICON) chewable tab 80 mg, 80 mg, Oral, TID            Objective:   Vital Signs:  Blood pressure 135/87, pulse 93, temperature 98 °F (36.7 °C), temperature source Oral

## 2020-09-16 NOTE — OCCUPATIONAL THERAPY NOTE
OCCUPATIONAL THERAPY TREATMENT NOTE - INPATIENT        Room Number: 443/443-A           Presenting Problem: SBO    Problem List  Principal Problem:    Small bowel obstruction ([address]      OCCUPATIONAL THERAPY ASSESSMENT     The following PPE was worn by this body clothing?: A Lot  -   Taking care of personal grooming such as brushing teeth?: A Lot  -   Eating meals?: A Lot    AM-PAC Score:  Score: 11  Approx Degree of Impairment: 70.42%  Standardized Score (AM-PAC Scale): 29.04  CMS Modifier (G-Code): CL    FU

## 2020-09-16 NOTE — PROGRESS NOTES
[facility]    Progress Note    Maynor Brasher Patient Status:  Inpatient    1935 MRN [id number]   Location [facility] 4W/SW/SE Attending Justice Rubinstein, MD   Hosp Day # 8 PCP Sheila Chiu       Subjective:   Sona Calle 09/16/2020    TROP <0.045 04/04/2020       Current Medications:  potassium chloride 40 mEq in sodium chloride 0.9% 250 mL IVPB, 40 mEq, Intravenous, Once  diphenhydrAMINE HCl (BENADRYL) IV PUSH injection 25 mg, 25 mg, Intravenous, Q4H PRN  HYDROmorphone HC Take 1 tablet (4 mg total) by mouth every 4 (four) hours as needed. Metoprolol Tartrate 50 MG Oral Tab, Take 50 mg by mouth 2 (two) times daily. hydrochlorothiazide 12.5 MG Oral Cap, Take 12.5 mg by mouth daily.   Dorzolamide HCl-Timolol Mal 22.3-6.8 MG/M Rhythm  -Left axis -anterior fascicular block.   -Left atrial enlargement.   Voltage criteria for LVH met.   -Nonspecific ST depression -Seen with left ventricular hypertrophy (strain) or digitalis effect. ABNORMAL           Assessment and Plan:    1.  Sma

## 2020-09-16 NOTE — PLAN OF CARE
Lisseth Stephens is confused. Pain managed with toradol and IV Tylenol. Denies n/v. Vitals stable. NPO with ice chips. Patient refusing medications this evening. No gas or BM noted. IVF continued. Voiding per honey. Complete care, Repositioned as tolerated. techniques  - Monitor for opioid side effects  - Notify MD/LIP if interventions unsuccessful or patient reports new pain  - Anticipate increased pain with activity and pre-medicate as appropriate  Outcome: Progressing     Problem: SAFETY ADULT - FALL  Goal contributing to decreased intake, treat as appropriate  - Assist with meals as needed  - Monitor I&O, WT and lab values  - Obtain nutritional consult as needed  - Optimize oral hygiene and moisture  - Encourage food from home; allow for food preferences  -

## 2020-09-16 NOTE — DIETARY NOTE
ADULT NUTRITION REASSESSMENT     Pt is at high nutrition risk due to planned TPN start tonight. Pt does not meet malnutrition criteria.       RECOMMENDATIONS TO MD:  See Nutrition Intervention  Calls to MD's regarding TPN start and PICC--agreed, orders p education: assess education needs    - Coordination of nutrition care: collaboration with other providers    - Discharge and transfer of nutrition care to new setting or provider: monitor plans    ADMITTING DIAGNOSIS:   Small bowel obstruction ([facility] [address] [W02. 6 5,000 Units Subcutaneous 2 times per day   • Acidophilus/Pectin  1 capsule Oral TID   • simethicone  80 mg Oral TID       LABS: reviewed. Noted K+ replaced with brinda.     Recent Labs     09/13/20  0555 09/14/20  0605 09/15/20  0618 09/16/20  0557   GLU  --

## 2020-09-16 NOTE — PROGRESS NOTES
[facility]    Progress Note    Tori Rayalisson Patient Status:  Inpatient    1935 MRN [id number]   Location [facility] 4W/SW/SE Attending Ramiro Bryant MD   Hosp Day # 8 PCP Sherman Maxwell       Subjective:     Confused management     COPD.   No exacerbation.  -Manage expectantly     ----------------------------------  diet: npo/ice chips  ivf:  D5 1/2NS 83ml/h  dvt prophylaxis: sc heparin  antibiotics: zosyn  tubes: ngt removed 9/15  central lines: none  code status: full

## 2020-09-17 LAB
ALBUMIN SERPL-MCNC: 2.5 G/DL (ref 3.4–5)
ALBUMIN/GLOB SERPL: 0.6 {RATIO} (ref 1–2)
ALP LIVER SERPL-CCNC: 73 U/L (ref 55–142)
ALT SERPL-CCNC: 12 U/L (ref 13–56)
ANION GAP SERPL CALC-SCNC: 4 MMOL/L (ref 0–18)
ANION GAP SERPL CALC-SCNC: 4 MMOL/L (ref 0–18)
AST SERPL-CCNC: 19 U/L (ref 15–37)
BASOPHILS # BLD AUTO: 0.06 X10(3) UL (ref 0–0.2)
BASOPHILS NFR BLD AUTO: 0.9 %
BILIRUB SERPL-MCNC: 0.5 MG/DL (ref 0.1–2)
BUN BLD-MCNC: 8 MG/DL (ref 7–18)
BUN BLD-MCNC: 8 MG/DL (ref 7–18)
BUN/CREAT SERPL: 8.5 (ref 10–20)
BUN/CREAT SERPL: 8.5 (ref 10–20)
CALCIUM BLD-MCNC: 8.4 MG/DL (ref 8.5–10.1)
CALCIUM BLD-MCNC: 8.4 MG/DL (ref 8.5–10.1)
CHLORIDE SERPL-SCNC: 107 MMOL/L (ref 98–112)
CHLORIDE SERPL-SCNC: 107 MMOL/L (ref 98–112)
CO2 SERPL-SCNC: 29 MMOL/L (ref 21–32)
CO2 SERPL-SCNC: 29 MMOL/L (ref 21–32)
CREAT BLD-MCNC: 0.94 MG/DL (ref 0.55–1.02)
CREAT BLD-MCNC: 0.94 MG/DL (ref 0.55–1.02)
DEPRECATED RDW RBC AUTO: 42.7 FL (ref 35.1–46.3)
EOSINOPHIL # BLD AUTO: 0.42 X10(3) UL (ref 0–0.7)
EOSINOPHIL NFR BLD AUTO: 6.1 %
ERYTHROCYTE [DISTWIDTH] IN BLOOD BY AUTOMATED COUNT: 13.2 % (ref 11–15)
GLOBULIN PLAS-MCNC: 4.2 G/DL (ref 2.8–4.4)
GLUCOSE BLD-MCNC: 100 MG/DL (ref 70–99)
GLUCOSE BLD-MCNC: 100 MG/DL (ref 70–99)
GLUCOSE BLDC GLUCOMTR-MCNC: 100 MG/DL (ref 70–99)
GLUCOSE BLDC GLUCOMTR-MCNC: 103 MG/DL (ref 70–99)
GLUCOSE BLDC GLUCOMTR-MCNC: 108 MG/DL (ref 70–99)
GLUCOSE BLDC GLUCOMTR-MCNC: 126 MG/DL (ref 70–99)
HAV IGM SER QL: 1.9 MG/DL (ref 1.6–2.6)
HCT VFR BLD AUTO: 40.7 % (ref 35–48)
HGB BLD-MCNC: 13.3 G/DL (ref 12–16)
IMM GRANULOCYTES # BLD AUTO: 0.02 X10(3) UL (ref 0–1)
IMM GRANULOCYTES NFR BLD: 0.3 %
LYMPHOCYTES # BLD AUTO: 1.77 X10(3) UL (ref 1–4)
LYMPHOCYTES NFR BLD AUTO: 25.7 %
M PROTEIN MFR SERPL ELPH: 6.7 G/DL (ref 6.4–8.2)
MCH RBC QN AUTO: 29.3 PG (ref 26–34)
MCHC RBC AUTO-ENTMCNC: 32.7 G/DL (ref 31–37)
MCV RBC AUTO: 89.6 FL (ref 80–100)
MONOCYTES # BLD AUTO: 0.61 X10(3) UL (ref 0.1–1)
MONOCYTES NFR BLD AUTO: 8.9 %
NEUTROPHILS # BLD AUTO: 4.01 X10 (3) UL (ref 1.5–7.7)
NEUTROPHILS # BLD AUTO: 4.01 X10(3) UL (ref 1.5–7.7)
NEUTROPHILS NFR BLD AUTO: 58.1 %
OSMOLALITY SERPL CALC.SUM OF ELEC: 288 MOSM/KG (ref 275–295)
OSMOLALITY SERPL CALC.SUM OF ELEC: 288 MOSM/KG (ref 275–295)
PHOSPHATE SERPL-MCNC: 2.4 MG/DL (ref 2.5–4.9)
PLATELET # BLD AUTO: 281 10(3)UL (ref 150–450)
POTASSIUM SERPL-SCNC: 3.9 MMOL/L (ref 3.5–5.1)
POTASSIUM SERPL-SCNC: 3.9 MMOL/L (ref 3.5–5.1)
RBC # BLD AUTO: 4.54 X10(6)UL (ref 3.8–5.3)
SODIUM SERPL-SCNC: 140 MMOL/L (ref 136–145)
SODIUM SERPL-SCNC: 140 MMOL/L (ref 136–145)
WBC # BLD AUTO: 6.9 X10(3) UL (ref 4–11)

## 2020-09-17 PROCEDURE — 99232 SBSQ HOSP IP/OBS MODERATE 35: CPT | Performed by: HOSPITALIST

## 2020-09-17 NOTE — PROGRESS NOTES
[place] [facility] - [facility]    Progress Note    Jeancarlos Seay Patient Status:  Inpatient    1935 MRN [id number]   Location [facility] 4W/SW/SE Attending Shea Rowan MD   Hosp Day # 9 PCP Missy Lora       Subjective:     Confused D5 1/2NS 83ml/h  dvt prophylaxis: sc heparin  antibiotics: zosyn  tubes: ngt removed 9/15  central lines: none  code status: full code  dispo: TBD  Results:     Lab Results   Component Value Date    WBC 6.9 09/17/2020    HGB 13.3 09/17/2020    HCT 40.7 09/

## 2020-09-17 NOTE — PROGRESS NOTES
[facility]    Progress Note    Apurva Kathyav Patient Status:  Inpatient    1935 MRN [id number]   Location [facility] 4W/SW/SE Attending Faye Fitzgerald MD   Hosp Day # 9 PCP Sol Kill       Subjective:   Krista Clement TP 6.7 09/17/2020    AST 19 09/17/2020    ALT 12 (L) 09/17/2020    LIP 89 09/07/2020    CRP 1.22 (H) 03/20/2020    MG 2.0 09/16/2020    PHOS 2.4 (L) 09/17/2020    TROP <0.045 04/04/2020       Current Medications:  dextrose 10 % infusion, , Intravenous, PRN  Metoclopramide HCl (REGLAN) injection 5 mg, 5 mg, Intravenous, Q8H PRN  acetaminophen (OFIRMEV) infusion 1,000 mg, 1,000 mg, Intravenous, Q6H PRN  Acidophilus/Pectin (PROBIOTIC) CAPS 1 capsule, 1 capsule, Oral, TID  simethicone (MYLICON) chewable tab diastolic     dysfunction). 2. Aortic valve: There is sclerosis without stenosis. Moderate     regurgitation. 3. Mitral valve: Mildly calcified annulus. Mildly thickened, mildly     calcified leaflets. Moderate regurgitation. 4. Tricuspid valve:  Duggan Maury

## 2020-09-17 NOTE — PLAN OF CARE
Galo Davis is A&O to self. Pain managed with Tylenol PRN. Vitals stable. NPO, sips with meds. TPN initiated through PICC line. Accuchecks Q6. Productive cough at times, [place] at bedside PRN with encouragment to cough and deep breathe.  Complete care provided, distraction and/or relaxation techniques  - Monitor for opioid side effects  - Notify MD/LIP if interventions unsuccessful or patient reports new pain  - Anticipate increased pain with activity and pre-medicate as appropriate  Outcome: Progressing     Prob consumed  - Identify factors contributing to decreased intake, treat as appropriate  - Assist with meals as needed  - Monitor I&O, WT and lab values  - Obtain nutritional consult as needed  - Optimize oral hygiene and moisture  - Encourage food from home;

## 2020-09-17 NOTE — PROGRESS NOTES
[place] [facility] - [facility]    Progress Note    Mikayla Dexter Patient Status:  Inpatient    1935 MRN [id number]   Location [facility] 4W/SW/SE Attending Dennis Gautam MD   [facility] Day # 9 PCP Marina Norton        Subjective:   Mikayla Dexter i sodium chloride 0.9% 250 mL IVPB, 40 mEq, Intravenous, Once  [COMPLETED] Magnesium Sulfate IVPB premix SOLN 2 g, 2 g, Intravenous, Once  [COMPLETED] metoprolol Tartrate (LOPRESSOR) 5 MG/5ML injection 2.5 mg, 2.5 mg, Intravenous, Once  [COMPLETED] albuterol PRN    Or  metoprolol Tartrate (LOPRESSOR) 5 MG/5ML injection 2.5 mg, 2.5 mg, Intravenous, PRN  Heparin Sodium (Porcine) 5000 UNIT/ML injection 5,000 Units, 5,000 Units, Subcutaneous, 2 times per day  ondansetron HCl (ZOFRAN) injection 4 mg, 4 mg, Intraven 29.0 09/17/2020     (H) 09/17/2020     (H) 09/17/2020    CA 8.4 (L) 09/17/2020    CA 8.4 (L) 09/17/2020    ALB 2.5 (L) 09/17/2020    ALKPHO 73 09/17/2020    BILT 0.5 09/17/2020    TP 6.7 09/17/2020    AST 19 09/17/2020    ALT 12 (L) 09/17/202

## 2020-09-17 NOTE — CM/SW NOTE
9/21 1155am: HME order form signed and MD documentation complete. SW notified Winifred Lind with HME to process the referral so the equipment can be delivered the day of discharge if not the day before.   Daniel Mendoza will need to also be notified of DC date when

## 2020-09-17 NOTE — PLAN OF CARE
Plan of care reviewed with Wanda Muhammad and her daughter, Larry Nilsa at bedside  TPN continued  Bowel sounds present- patient started on sips of clears and is tolerating well  Up to chair with gait belt and max assistance.   Safety measures in place and call light wi relaxation techniques  - Monitor for opioid side effects  - Notify MD/LIP if interventions unsuccessful or patient reports new pain  - Anticipate increased pain with activity and pre-medicate as appropriate  Outcome: Progressing     Problem: SAFETY ADULT - factors contributing to decreased intake, treat as appropriate  - Assist with meals as needed  - Monitor I&O, WT and lab values  - Obtain nutritional consult as needed  - Optimize oral hygiene and moisture  - Encourage food from home; allow for food prefer

## 2020-09-18 ENCOUNTER — APPOINTMENT (OUTPATIENT)
Dept: GENERAL RADIOLOGY | Facility: HOSPITAL | Age: 85
DRG: 336 | End: 2020-09-18
Attending: HOSPITALIST
Payer: MEDICARE

## 2020-09-18 LAB
ANION GAP SERPL CALC-SCNC: 5 MMOL/L (ref 0–18)
BASOPHILS # BLD AUTO: 0.05 X10(3) UL (ref 0–0.2)
BASOPHILS NFR BLD AUTO: 0.6 %
BUN BLD-MCNC: 13 MG/DL (ref 7–18)
BUN/CREAT SERPL: 13 (ref 10–20)
CALCIUM BLD-MCNC: 8.3 MG/DL (ref 8.5–10.1)
CHLORIDE SERPL-SCNC: 108 MMOL/L (ref 98–112)
CO2 SERPL-SCNC: 28 MMOL/L (ref 21–32)
CREAT BLD-MCNC: 1 MG/DL (ref 0.55–1.02)
DEPRECATED RDW RBC AUTO: 43.8 FL (ref 35.1–46.3)
EOSINOPHIL # BLD AUTO: 0.46 X10(3) UL (ref 0–0.7)
EOSINOPHIL NFR BLD AUTO: 5.7 %
ERYTHROCYTE [DISTWIDTH] IN BLOOD BY AUTOMATED COUNT: 13.3 % (ref 11–15)
GLUCOSE BLD-MCNC: 112 MG/DL (ref 70–99)
GLUCOSE BLDC GLUCOMTR-MCNC: 102 MG/DL (ref 70–99)
GLUCOSE BLDC GLUCOMTR-MCNC: 109 MG/DL (ref 70–99)
GLUCOSE BLDC GLUCOMTR-MCNC: 110 MG/DL (ref 70–99)
GLUCOSE BLDC GLUCOMTR-MCNC: 136 MG/DL (ref 70–99)
HAV IGM SER QL: 2.2 MG/DL (ref 1.6–2.6)
HCT VFR BLD AUTO: 39.5 % (ref 35–48)
HGB BLD-MCNC: 12.6 G/DL (ref 12–16)
IMM GRANULOCYTES # BLD AUTO: 0.03 X10(3) UL (ref 0–1)
IMM GRANULOCYTES NFR BLD: 0.4 %
LYMPHOCYTES # BLD AUTO: 2.07 X10(3) UL (ref 1–4)
LYMPHOCYTES NFR BLD AUTO: 25.8 %
MCH RBC QN AUTO: 28.6 PG (ref 26–34)
MCHC RBC AUTO-ENTMCNC: 31.9 G/DL (ref 31–37)
MCV RBC AUTO: 89.6 FL (ref 80–100)
MONOCYTES # BLD AUTO: 0.68 X10(3) UL (ref 0.1–1)
MONOCYTES NFR BLD AUTO: 8.5 %
NEUTROPHILS # BLD AUTO: 4.73 X10 (3) UL (ref 1.5–7.7)
NEUTROPHILS # BLD AUTO: 4.73 X10(3) UL (ref 1.5–7.7)
NEUTROPHILS NFR BLD AUTO: 59 %
OSMOLALITY SERPL CALC.SUM OF ELEC: 293 MOSM/KG (ref 275–295)
PHOSPHATE SERPL-MCNC: 2.6 MG/DL (ref 2.5–4.9)
PLATELET # BLD AUTO: 276 10(3)UL (ref 150–450)
POTASSIUM SERPL-SCNC: 4.1 MMOL/L (ref 3.5–5.1)
RBC # BLD AUTO: 4.41 X10(6)UL (ref 3.8–5.3)
SODIUM SERPL-SCNC: 141 MMOL/L (ref 136–145)
WBC # BLD AUTO: 8 X10(3) UL (ref 4–11)

## 2020-09-18 PROCEDURE — 74018 RADEX ABDOMEN 1 VIEW: CPT | Performed by: HOSPITALIST

## 2020-09-18 PROCEDURE — 99232 SBSQ HOSP IP/OBS MODERATE 35: CPT | Performed by: HOSPITALIST

## 2020-09-18 RX ORDER — VANCOMYCIN HYDROCHLORIDE 125 MG/1
125 CAPSULE ORAL DAILY
Status: DISCONTINUED | OUTPATIENT
Start: 2020-09-18 | End: 2020-09-18

## 2020-09-18 NOTE — PROGRESS NOTES
[facility]    Progress Note    Hillary Fatima Patient Status:  Inpatient    1935 MRN [id number]   Location [facility] 4W/SW/SE Attending Alexander Turcios MD   Hosp Day # 10 PCP Amber Capps        Subjective:   Hillary Fatima 0.5 mg, 0.5 mg, Intravenous, Q3H PRN  [] Ketorolac Tromethamine (TORADOL) injection 15 mg, 15 mg, Intravenous, Q6H PRN  [COMPLETED] potassium chloride 40 mEq in sodium chloride 0.9% 250 mL IVPB, 40 mEq, Intravenous, Once  [COMPLETED] Magnesium Sulfa infusion, , Intravenous, Once  Normal Saline Flush 0.9 % injection 3 mL, 3 mL, Intravenous, PRN  metoprolol Tartrate (LOPRESSOR) 5 MG/5ML injection 5 mg, 5 mg, Intravenous, PRN    Or  metoprolol Tartrate (LOPRESSOR) 5 MG/5ML injection 2.5 mg, 2.5 mg, Intra 09/18/2020    BUN 13 09/18/2020     09/18/2020    K 4.1 09/18/2020     09/18/2020    CO2 28.0 09/18/2020     (H) 09/18/2020    CA 8.3 (L) 09/18/2020    ALB 2.5 (L) 09/17/2020    ALKPHO 73 09/17/2020    BILT 0.5 09/17/2020    TP 6.7 09/17

## 2020-09-18 NOTE — PROGRESS NOTES
[facility]    Progress Note    Samantha Garces Patient Status:  Inpatient    1935 MRN [id number]   Location [facility] 4W/SW/SE Attending Robel Rubio MD   Hosp Day # 10 PCP Adrian Brooks       Subjective:   Collene Public 04/04/2020       Current Medications:  vancomycin HCl (FIRVANQ) 50 MG/ML oral solution 125 mg, 125 mg, Oral, Daily  adult 3 in 1 TPN, , Intravenous, Continuous TPN  adult 3 in 1 TPN, , Intravenous, Continuous TPN  dextrose 10 % infusion, , Intravenous, Con Q8H PRN  acetaminophen (OFIRMEV) infusion 1,000 mg, 1,000 mg, Intravenous, Q6H PRN  Acidophilus/Pectin (PROBIOTIC) CAPS 1 capsule, 1 capsule, Oral, TID  simethicone (MYLICON) chewable tab 80 mg, 80 mg, Oral, TID      acetaminophen (TYLENOL EXTRA STRENGTH) Moderate     regurgitation. 3. Mitral valve: Mildly calcified annulus. Mildly thickened, mildly     calcified leaflets. Moderate regurgitation. 4. Tricuspid valve: Moderate regurgitation. 5. Impressions:  The right ventricular systolic pressure was     i

## 2020-09-18 NOTE — PHYSICAL THERAPY NOTE
PHYSICAL THERAPY TREATMENT NOTE - INPATIENT     Room Number: 443/443-A       Presenting Problem: SBO (being treated conservatively); nasuea, vomitting, abdominal pain    Problem List  Principal Problem:    Small bowel obstruction ([address]      PHYSICAL THERAP training    SUBJECTIVE  cooperative    OBJECTIVE  Precautions: Bed/chair alarm    WEIGHT BEARING RESTRICTION                   PAIN ASSESSMENT   Rating: Unable to rate          BALANCE Patient is able to demonstrate transfers Sit to/from Stand at assistance level: minimum assistance x 1 with walker - rolling     Goal #2  Current Status Max a x 2   Goal #3 Patient is able to ambulate 15 feet with assist device: walker - rolling at [employer]

## 2020-09-18 NOTE — PLAN OF CARE
Patient is alert and oriented x1. VSS. Denies pain. POC reviewed with pt and son at bedside. TPN continued. IV abx continued. Patient denies passing gas or BM, bowel sounds active. Patient tolerating sips of clear liquids.  Plan to dc home with family and H distraction and/or relaxation techniques  - Monitor for opioid side effects  - Notify MD/LIP if interventions unsuccessful or patient reports new pain  - Anticipate increased pain with activity and pre-medicate as appropriate  Outcome: Progressing     Prob consumed  - Identify factors contributing to decreased intake, treat as appropriate  - Assist with meals as needed  - Monitor I&O, WT and lab values  - Obtain nutritional consult as needed  - Optimize oral hygiene and moisture  - Encourage food from home;

## 2020-09-18 NOTE — HOME CARE LIAISON
Received referral from Charles Burton - unable to service pt at this time, re-referred to [facility] who can accept patient at discharge. Please let them know of dc updated [phone].

## 2020-09-18 NOTE — PROGRESS NOTES
[facility]    CDI Prediction Tool Protocol (Vancomycin Initiated)    OVP (oral vancomycin prophylaxis) 125 mg PO Daily is being started in this patient based on a score of 13.       Score Breakdown:  High risk antibiotic use (5 points)  Hospital

## 2020-09-18 NOTE — PLAN OF CARE
Patient is alert and oriented to self. Not oriented to place, situation or time. Vital signs stable. Patient doesn't verbalize pain, but guards abdomen and moans. Patient was tired, agitated and was not following commands today.  Patient cooperated with ora additional Care Plan goals for specific interventions   9/18/2020 1252 by Nagi Rae RN  Outcome: Not Progressing     Problem: PAIN - ADULT  Goal: Verbalizes/displays adequate comfort level or patient's stated pain goal  Description  INTERVENTIONS:  - appropriate  - Advance diet as tolerated, if ordered  - Obtain nutritional consult as needed  - Evaluate fluid balance  9/18/2020 1252 by Adore Dickerson RN  Outcome: Not Progressing     Problem: GASTROINTESTINAL - ADULT  Goal: Maintains or returns to base

## 2020-09-18 NOTE — PROGRESS NOTES
[facility]    Progress Note    Ciara Morgan Patient Status:  Inpatient    1935 MRN [id number]   Location [facility] 4W/SW/SE Attending Christina Slaughter MD   Hosp Day # 10 PCP Xi Bill       Subjective:     Confuse plans home with [facility] once stable, pt will need hospital bed and lift    On 9/18/2020 I had a face to face encounter with Tarik Watts for a semi electric hospital bed medical necessity evaluation.   Patient has a history of multiinfarct dementia and COPD,

## 2020-09-19 LAB
ANION GAP SERPL CALC-SCNC: 4 MMOL/L (ref 0–18)
BUN BLD-MCNC: 16 MG/DL (ref 7–18)
BUN/CREAT SERPL: 17.6 (ref 10–20)
CALCIUM BLD-MCNC: 8.1 MG/DL (ref 8.5–10.1)
CHLORIDE SERPL-SCNC: 110 MMOL/L (ref 98–112)
CO2 SERPL-SCNC: 27 MMOL/L (ref 21–32)
CREAT BLD-MCNC: 0.91 MG/DL
GLUCOSE BLD-MCNC: 110 MG/DL (ref 70–99)
HAV IGM SER QL: 2.1 MG/DL (ref 1.6–2.6)
OSMOLALITY SERPL CALC.SUM OF ELEC: 294 MOSM/KG (ref 275–295)
PHOSPHATE SERPL-MCNC: 3.4 MG/DL (ref 2.5–4.9)
POTASSIUM SERPL-SCNC: 4.2 MMOL/L (ref 3.5–5.1)
SODIUM SERPL-SCNC: 141 MMOL/L (ref 136–145)

## 2020-09-19 PROCEDURE — 99232 SBSQ HOSP IP/OBS MODERATE 35: CPT | Performed by: HOSPITALIST

## 2020-09-19 NOTE — PLAN OF CARE
Problem: Patient Centered Care  Goal: Patient preferences are identified and integrated in the patient's plan of care  Description: Interventions:  - What would you like us to know as we care for you?  Per daughter [place] patient had COVID in the spring a Free from fall injury  Description: INTERVENTIONS:  - Assess pt frequently for physical needs  - Identify cognitive and physical deficits and behaviors that affect risk of falls.   - [place] fall precautions as indicated by assessment.  - Educate pt/famil eating environment  Outcome: Progressing   Patient is alert and oriented x1. VSS. Denies pain. POC reviewed with pt and daughter at bedside. TPN continued. accuchecks controlled, discontinued per protocol. IV abx continued.  Patient denies passing gas or BM

## 2020-09-19 NOTE — PROGRESS NOTES
[facility]    Progress Note    Cornelius Pedraza Patient Status:  Inpatient    1935 MRN [id number]   Location [facility] 4W/SW/SE Attending Kristi Lepe MD   Hosp Day # 11 JEM Barahona       Subjective:   Alex Corona Current Medications:    •  adult 3 in 1 TPN, , Intravenous, Continuous TPN    •  vancomycin HCl (FIRVANQ) 50 MG/ML oral solution 125 mg, 125 mg, Oral, Daily    •  adult 3 in 1 TPN, , Intravenous, Continuous TPN    •  dextrose 10 % infusion, , Intrave Intravenous, Q8H PRN    •  acetaminophen (OFIRMEV) infusion 1,000 mg, 1,000 mg, Intravenous, Q6H PRN    •  Acidophilus/Pectin (PROBIOTIC) CAPS 1 capsule, 1 capsule, Oral, TID    •  simethicone (MYLICON) chewable tab 80 mg, 80 mg, Oral, TID        •  acetam 9/18/2020 at 5:00 PM     Finalized by (CST): Nagi Fernandez MD on 9/18/2020 at 5:07 PM                  Echo 9/10/20  Study Conclusions  1. Left ventricle: The cavity size was normal. Wall thickness was     increased in a pattern of mild LVH.  Systolic

## 2020-09-19 NOTE — PROGRESS NOTES
[facility]    Progress Note    Blake Leiva Patient Status:  Inpatient    1935 MRN [id number]   Location [facility] 4W/SW/SE Attending Velasquez Guerrero MD   [facility] Day # 11 PCP Dixie Brown        Subjective:   Blake Leiva injection 0.5 mg, 0.5 mg, Intravenous, Q3H PRN    •  [] Ketorolac Tromethamine (TORADOL) injection 15 mg, 15 mg, Intravenous, Q6H PRN    •  [COMPLETED] potassium chloride 40 mEq in sodium chloride 0.9% 250 mL IVPB, 40 mEq, Intravenous, Once    •  [facility] [COMPLETED] ondansetron HCl (ZOFRAN) injection 4 mg, 4 mg, Intravenous, Once    •  [COMPLETED] lactated ringers infusion, , Intravenous, Once    •  Normal Saline Flush 0.9 % injection 3 mL, 3 mL, Intravenous, PRN    •  metoprolol Tartrate (LOPRESSOR) 5 MG Group  9/19/2020  9:48 AM                Results:     Lab Results   Component Value Date    WBC 8.0 09/18/2020    HGB 12.6 09/18/2020    HCT 39.5 09/18/2020    .0 09/18/2020    CREATSERUM 0.91 09/19/2020    BUN 16 09/19/2020     09/19/2020

## 2020-09-19 NOTE — PLAN OF CARE
VSS. Pt taking very little liquid from refreshment room. IV ABX. Pt going home with family with HH. TPN @ 83, pt is not oriented unsure if passing gas, bowel sounds present. Iv tylenol given.   Problem: Patient Centered Care  Goal: Patient preferences are i interventions unsuccessful or patient reports new pain  - Anticipate increased pain with activity and pre-medicate as appropriate  Outcome: Progressing     Problem: SAFETY ADULT - FALL  Goal: Free from fall injury  Description: INTERVENTIONS:  - Assess pt as needed  - Monitor I&O, WT and lab values  - Obtain nutritional consult as needed  - Optimize oral hygiene and moisture  - Encourage food from home; allow for food preferences  - Enhance eating environment  Outcome: Progressing

## 2020-09-20 LAB
ANION GAP SERPL CALC-SCNC: 5 MMOL/L (ref 0–18)
BASOPHILS # BLD AUTO: 0.09 X10(3) UL (ref 0–0.2)
BASOPHILS NFR BLD AUTO: 1.2 %
BUN BLD-MCNC: 16 MG/DL (ref 7–18)
BUN/CREAT SERPL: 17.6 (ref 10–20)
CALCIUM BLD-MCNC: 8.5 MG/DL (ref 8.5–10.1)
CHLORIDE SERPL-SCNC: 111 MMOL/L (ref 98–112)
CO2 SERPL-SCNC: 20 MMOL/L (ref 21–32)
CREAT BLD-MCNC: 0.91 MG/DL
DEPRECATED RDW RBC AUTO: 58.1 FL (ref 35.1–46.3)
EOSINOPHIL # BLD AUTO: 0.37 X10(3) UL (ref 0–0.7)
EOSINOPHIL NFR BLD AUTO: 5.1 %
ERYTHROCYTE [DISTWIDTH] IN BLOOD BY AUTOMATED COUNT: 14.9 % (ref 11–15)
GLUCOSE BLD-MCNC: 100 MG/DL (ref 70–99)
HCT VFR BLD AUTO: 37 %
HGB BLD-MCNC: 11.5 G/DL
IMM GRANULOCYTES # BLD AUTO: 0.04 X10(3) UL (ref 0–1)
IMM GRANULOCYTES NFR BLD: 0.5 %
LYMPHOCYTES # BLD AUTO: 1.75 X10(3) UL (ref 1–4)
LYMPHOCYTES NFR BLD AUTO: 23.9 %
MCH RBC QN AUTO: 33.1 PG (ref 26–34)
MCHC RBC AUTO-ENTMCNC: 31.1 G/DL (ref 31–37)
MCV RBC AUTO: 106.6 FL
MONOCYTES # BLD AUTO: 0.64 X10(3) UL (ref 0.1–1)
MONOCYTES NFR BLD AUTO: 8.7 %
NEUTROPHILS # BLD AUTO: 4.43 X10 (3) UL (ref 1.5–7.7)
NEUTROPHILS # BLD AUTO: 4.43 X10(3) UL (ref 1.5–7.7)
NEUTROPHILS NFR BLD AUTO: 60.6 %
OSMOLALITY SERPL CALC.SUM OF ELEC: 283 MOSM/KG (ref 275–295)
POTASSIUM SERPL-SCNC: 4.7 MMOL/L (ref 3.5–5.1)
RBC # BLD AUTO: 3.47 X10(6)UL
SODIUM SERPL-SCNC: 136 MMOL/L (ref 136–145)
WBC # BLD AUTO: 7.3 X10(3) UL (ref 4–11)

## 2020-09-20 PROCEDURE — 99232 SBSQ HOSP IP/OBS MODERATE 35: CPT | Performed by: HOSPITALIST

## 2020-09-20 RX ORDER — SODIUM PHOSPHATE, DIBASIC AND SODIUM PHOSPHATE, MONOBASIC 7; 19 G/133ML; G/133ML
1 ENEMA RECTAL ONCE AS NEEDED
Status: COMPLETED | OUTPATIENT
Start: 2020-09-20 | End: 2020-09-20

## 2020-09-20 NOTE — PLAN OF CARE
Problem: Patient Centered Care  Goal: Patient preferences are identified and integrated in the patient's plan of care  Description: Interventions:  - What would you like us to know as we care for you?  Per daughter Yolanda Melendez patient had COVID in the spring a Free from fall injury  Description: INTERVENTIONS:  - Assess pt frequently for physical needs  - Identify cognitive and physical deficits and behaviors that affect risk of falls.   - [place] fall precautions as indicated by assessment.  - Educate pt/famil eating environment  Outcome: Progressing     Alert and oriented to self. Vital signs are stable. Appears comfortable with no signs of pain. PICC is intact and infusing. Purewick in place and draining. Turned and repositioned. SCD on.  Bed in the lowest p

## 2020-09-20 NOTE — DIETARY NOTE
ADULT NUTRITION REASSESSMENT     Pt is at high nutrition risk due to TPN. Pt does not meet malnutrition criteria. RECOMMENDATIONS TO MD:  See Nutrition Intervention  Suggest wean TPN when diet advanced to solids and eating >50% of meals.           Katina Garcia nutrition care to new setting or provider: monitor plans    ADMITTING DIAGNOSIS:   Small bowel obstruction ([facility] [F54.135]    PAST MEDICAL HISTORY   has a past medical history of COPD (chronic obstructive pulmonary disease) ([facility] Essential hypertension, G 09/19/20  0439 09/20/20  0757   *  100* 112* 110* 100*   BUN 8  8 13 16 16   CREATSERUM 0.94  0.94 1.00 0.91 0.91   CA 8.4*  8.4* 8.3* 8.1* 8.5   MG 1.9 2.2 2.1  --      140 141 141 136   K 3.9  3.9 4.1 4.2 4.7     107 108 110 111   CO2

## 2020-09-20 NOTE — PROGRESS NOTES
[facility]    Progress Note    [facility] Patient Status:  Inpatient    1935 MRN [id number]   Location [facility] 4W/SW/SE Attending Buzz Estrada MD   [address] Day # 11 PCP Alia Thao       Subjective:     No feve delirium  -Reorientation  -Minimize narcotics  -Expectant management     COPD.   No exacerbation.  -Manage expectantly    Dispo: plans home with [facility] once stable, pt will need hospital bed and lift    On 9/18/2020 I had a face to face encounter with [place] Shen region/perineum of uncertain location.   Recommend clinical correlation    Dictated by (CST): Spencer Carreon MD on 9/18/2020 at 5:00 PM     Finalized by (CST): Spencer Carreon MD on 9/18/2020 at 5:07 PM              D/w pt, RN, dtr,        Bisi Santos

## 2020-09-20 NOTE — PLAN OF CARE
Rafaela Plummer remains pleasantly confused througout the shift. Daughter at the bedside throughout the shift. Diet advanced to clears. No nausea/vomiting noted. Fleets enema given. Awaiting results. Patient up to the chair.  Max assist and back to bed wit management  - Manage/alleviate anxiety  - Utilize distraction and/or relaxation techniques  - Monitor for opioid side effects  - Notify MD/LIP if interventions unsuccessful or patient reports new pain  - Anticipate increased pain with activity and pre-medi INTERVENTIONS:  - Monitor percentage of each meal consumed  - Identify factors contributing to decreased intake, treat as appropriate  - Assist with meals as needed  - Monitor I&O, WT and lab values  - Obtain nutritional consult as needed  - Optimize oral

## 2020-09-20 NOTE — PROGRESS NOTES
[facility]    Progress Note    Apurva Hummel Patient Status:  Inpatient    1935 MRN [id number]   Location [facility] 4W/SW/SE Attending Faye Fitzgerald MD   Hosp Day # 12 PCP Sol Kill       Subjective:   Girma Becker ALB 2.5 (L) 09/17/2020    ALKPHO 73 09/17/2020    BILT 0.5 09/17/2020    TP 6.7 09/17/2020    AST 19 09/17/2020    ALT 12 (L) 09/17/2020    LIP 89 09/07/2020    CRP 1.22 (H) 03/20/2020    MG 2.1 09/19/2020    PHOS 3.4 09/19/2020    TROP <0.045 04/04/2020 Intravenous, PRN    •  Heparin Sodium (Porcine) 5000 UNIT/ML injection 5,000 Units, 5,000 Units, Subcutaneous, 2 times per day    •  ondansetron HCl (ZOFRAN) injection 4 mg, 4 mg, Intravenous, Q6H PRN    •  Metoclopramide HCl (REGLAN) injection 5 mg, 5 mg, abdomen has diminished consistent with resolving partial small bowel obstruction. 3. External catheter projects in the lower pelvic region/perineum of uncertain location.   Recommend clinical correlation    Dictated by (CST): Erica Lui MD on 9/18/

## 2020-09-20 NOTE — PROGRESS NOTES
[place] [facility] - [facility]    Progress Note    Genna Maloney Patient Status:  Inpatient    1935 MRN [id number]   Location [facility] 4W/SW/SE Attending Patrick Rosen MD   Hosp Day # 12 PCP Deshawn De La Fuente       Subjective:     No feve management     COPD.   No exacerbation.  -Manage expectantly    Dispo: plans home with [place] once stable, pt will need hospital bed and lift    On 9/18/2020 I had a face to face encounter with Gallo Ward for a semi electric hospital bed medical necessity small bowel dilatation left mid abdomen has diminished consistent with resolving partial small bowel obstruction. 3. External catheter projects in the lower pelvic region/perineum of uncertain location.   Recommend clinical correlation    Dictated by (CST):

## 2020-09-20 NOTE — PROGRESS NOTES
[place] [facility] - [facility]    Progress Note    Samantha Garces Patient Status:  Inpatient    1935 MRN [id number]   Location [facility] 4W/SW/SE Attending Laura Patterson MD   [address] Day # 12 PCP Adrian Brooks        Subjective:   Samantha Garces mg, Intravenous, Once    •  HYDROmorphone HCl (DILAUDID) 1 MG/ML injection 0.5 mg, 0.5 mg, Intravenous, Q3H PRN    •  [] Ketorolac Tromethamine (TORADOL) injection 15 mg, 15 mg, Intravenous, Q6H PRN    •  [COMPLETED] potassium chloride 40 mEq in sod Sodium Chloride 0.9 % 10 mL IV push, 40 mg, Intravenous, Once    •  [COMPLETED] ondansetron HCl (ZOFRAN) injection 4 mg, 4 mg, Intravenous, Once    •  [COMPLETED] lactated ringers infusion, , Intravenous, Once    •  Normal Saline Flush 0.9 % injection 3 mL [facility]  General Surgery  [facility]  9/20/2020  1:07 PM                  Results:     Lab Results   Component Value Date    WBC 7.3 09/20/2020    HGB 11.5 (L) 09/20/2020    HCT 37.0 09/20/2020    PLT  09/20/2020      Comment:      An accurate plate

## 2020-09-21 LAB
ANION GAP SERPL CALC-SCNC: 3 MMOL/L (ref 0–18)
BUN BLD-MCNC: 15 MG/DL (ref 7–18)
BUN/CREAT SERPL: 18.3 (ref 10–20)
CALCIUM BLD-MCNC: 8.8 MG/DL (ref 8.5–10.1)
CHLORIDE SERPL-SCNC: 105 MMOL/L (ref 98–112)
CO2 SERPL-SCNC: 27 MMOL/L (ref 21–32)
CREAT BLD-MCNC: 0.82 MG/DL
GLUCOSE BLD-MCNC: 113 MG/DL (ref 70–99)
HAV IGM SER QL: 2.4 MG/DL (ref 1.6–2.6)
OSMOLALITY SERPL CALC.SUM OF ELEC: 282 MOSM/KG (ref 275–295)
PHOSPHATE SERPL-MCNC: 2.9 MG/DL (ref 2.5–4.9)
POTASSIUM SERPL-SCNC: 4.5 MMOL/L (ref 3.5–5.1)
SODIUM SERPL-SCNC: 135 MMOL/L (ref 136–145)

## 2020-09-21 PROCEDURE — 99232 SBSQ HOSP IP/OBS MODERATE 35: CPT | Performed by: HOSPITALIST

## 2020-09-21 NOTE — PROGRESS NOTES
[facility]    Progress Note    Ramsey Avendano Patient Status:  Inpatient    1935 MRN [id number]   Location [facility] 4W/SW/SE Attending Jasvir Hemphill MD   Hosp Day # 13 PCP Eduardo Beverage       Subjective:     No feve parenchymal abnormality with small left-sided effusion.  -Manage expectantly, improved     Multi-infarct dementia, advanced.  +confused. No evidence of delirium  -Reorientation  -Minimize narcotics  -Expectant management     COPD.   No exacerbation.  -Man PHOS 2.9 09/21/2020    TROP <0.045 04/04/2020       No results found.       D/w pt, RN, dtr,   Interval notes reviewed       Macho George MD  9/21/2020

## 2020-09-21 NOTE — PLAN OF CARE
Problem: Patient Centered Care  Goal: Patient preferences are identified and integrated in the patient's plan of care  Description: Interventions:  - What would you like us to know as we care for you?  Per daughter Erick Daniel patient had COVID in the spring a Free from fall injury  Description: INTERVENTIONS:  - Assess pt frequently for physical needs  - Identify cognitive and physical deficits and behaviors that affect risk of falls.   - [place] fall precautions as indicated by assessment.  - Educate pt/famil eating environment  Outcome: Venkatesh Cruz is alert x 1, max assist, turn q2hrs. TPN ongoing. Pt was able to move her bowels yesterday post enema. She is on tele. Plan to discharge back to home with [facility] when medically stable.

## 2020-09-21 NOTE — CM/SW NOTE
Care Progression Note:  Active Acute Medical Issue:   Small bowel obstruction (HCC)     POD#9  - s/p laparoscopic ANGIE- Dr. Kelley Moreno  -NG out  -Slow diet progression-to FLD today  -TPN continued  -Awating bowel function to return.    -PT/OT   -Pt stand and pivo

## 2020-09-21 NOTE — PROGRESS NOTES
[facility]    Progress Note    Genna Maloney Patient Status:  Inpatient    1935 MRN [id number]   Location [facility] 4W/SW/SE Attending Patrick Rosen MD   Hosp Day # 13 PCP Deshawn De La Fuente       Subjective:   Zeyad Chisholm 09/17/2020    BILT 0.5 09/17/2020    TP 6.7 09/17/2020    AST 19 09/17/2020    ALT 12 (L) 09/17/2020    LIP 89 09/07/2020    CRP 1.22 (H) 03/20/2020    MG 2.1 09/19/2020    PHOS 3.4 09/19/2020    TROP <0.045 04/04/2020       Current Medications:    •  adul Subcutaneous, 2 times per day    •  ondansetron HCl (ZOFRAN) injection 4 mg, 4 mg, Intravenous, Q6H PRN    •  Metoclopramide HCl (REGLAN) injection 5 mg, 5 mg, Intravenous, Q8H PRN    •  acetaminophen (OFIRMEV) infusion 1,000 mg, 1,000 mg, Intravenous, Q6H normal; there were no regional wall     motion abnormalities. Doppler parameters are consistent with     abnormal left ventricular relaxation (grade 1 diastolic     dysfunction). 2. Aortic valve: There is sclerosis without stenosis.  Moderate     regurgita

## 2020-09-22 LAB — TRIGL SERPL-MCNC: 59 MG/DL (ref 30–149)

## 2020-09-22 PROCEDURE — 99232 SBSQ HOSP IP/OBS MODERATE 35: CPT | Performed by: HOSPITALIST

## 2020-09-22 RX ORDER — ACETAMINOPHEN 325 MG/1
650 TABLET ORAL EVERY 6 HOURS PRN
Status: DISCONTINUED | OUTPATIENT
Start: 2020-09-22 | End: 2020-09-26

## 2020-09-22 NOTE — PROGRESS NOTES
[facility]    Progress Note    Beulah Combs Patient Status:  Inpatient    1935 MRN [id number]   Location [facility] 4W/SW/SE Attending Sandy Rodriguez MD   Hosp Day # 14 PCP John Steinberg       Subjective:     No feve abnormality with small left-sided effusion.  -Manage expectantly, improved     Multi-infarct dementia, advanced.  +confused. No evidence of delirium  -Reorientation  -Minimize narcotics  -Expectant management     COPD.   No exacerbation.  -Manage expectan 09/21/2020    TROP <0.045 04/04/2020       No results found.       D/w pt, RN, dtr,   Interval notes reviewed       Rustam Calhoun MD  9/22/2020

## 2020-09-22 NOTE — PLAN OF CARE
Pt up in chair for most of the day. Pt has not been talking during the day, but will nod/gesture appropriately. Per PCT, pt told him that she does not want to talk because she wants to go home. Daughter, Keila Montano, at bedside throughout the day.  Updated on pl reduce risk of injury  - Provide assistive devices as appropriate  - Consider OT/PT consult to assist with strengthening/mobility  - Encourage toileting schedule  Outcome: Progressing

## 2020-09-22 NOTE — PROGRESS NOTES
[facility]    Progress Note    Giovanny Ellis Patient Status:  Inpatient    1935 MRN [id number]   Location [facility] 4W/SW/SE Attending Alba Daniels MD   [facility] Day # 14 PCP Lisa Ramirez        Subjective:   Giovanny Ellis chloride 0.9% 250 mL IVPB, 40 mEq, Intravenous, Once    Followed by    •  [COMPLETED] potassium chloride IVPB premix 20 mEq, 20 mEq, Intravenous, Once    •  [COMPLETED] potassium chloride IVPB premix 20 mEq, 20 mEq, Intravenous, Once    •  diphenhydrAMINE latanoprost (XALATAN) 0.005 % ophthalmic solution 1 drop, 1 drop, Both Eyes, Nightly    •  [COMPLETED] potassium chloride 40 mEq in sodium chloride 0.9% 250 mL IVPB, 40 mEq, Intravenous, Once    Followed by    •  [COMPLETED] potassium chloride IVPB premix palpation. No rebound tenderness. Incision(s): C/D/I x 3  Musculoskeletal: Full range of motion of all extremities. No swelling noted. Integument: No lesions. No erythema. Psychiatric: Appropriate mood and affect.         Assessment and Plan:     Small

## 2020-09-22 NOTE — OCCUPATIONAL THERAPY NOTE
OCCUPATIONAL THERAPY TREATMENT NOTE - INPATIENT        Room Number: 443/443-A           Presenting Problem: SBO    Problem List  Principal Problem:    Small bowel obstruction ([address]      OCCUPATIONAL THERAPY ASSESSMENT     RN cleared pt for participation in Impairment: 70.42% has been calculated based on documentation in the [facility] '6 clicks' Inpatient Daily Activity Short Form. Research supports that patients with this level of impairment may benefit from JEISON.     Did discuss JEISON recommendation with DTR --who tested  Sit to Stand: Maximum assistance(x2) --pt initiating movement and assisting       BALANCE ASSESSMENT  Static Standing: fair- , regressing to poor-    FUNCTIONAL ADL ASSESSMENT  Lower Extremity Dressing: total A    Education Provided: role of OT,act

## 2020-09-22 NOTE — PHYSICAL THERAPY NOTE
PHYSICAL THERAPY RE- EVALUATION - INPATIENT     Room Number: 443/443-A  Evaluation Date: 9/22/2020  Type of Evaluation: Re-evaluation   Physician Order: PT Eval and Treat    Presenting Problem: SBO s/p laparoscopic lysis of adhesions 9/12  Reason for Ther patient's Approx Degree of Impairment: 72.57% has been calculated based on documentation in the [facility] '6 clicks' Inpatient Basic Mobility Short Form. Research supports that patients with this level of impairment may benefit from [facility], [facility].  PT discussed d/c re participate in therapy with presence and involvement of dtr in the room.      PHYSICAL THERAPY EXAMINATION     OBJECTIVE  Precautions: Bed/chair alarm  Fall Risk: High fall risk    COGNITION  · Following Commands:  follows one step commands with increased t marching  Ankle pumps  Knee extension  Transfer training    Patient End of Session: Up in chair;Needs met;Call light within reach;RN aware of session/findings; Family present    CURRENT GOALS    Goals to be met by: 10/6/20  Patient Goal Patient's self-state

## 2020-09-22 NOTE — PROGRESS NOTES
[facility]    Progress Note    Ramsey Avendano Patient Status:  Inpatient    1935 MRN [id number]   Location [facility] 4W/SW/SE Attending Gerson Roa MD   [facility] Day # 15 PCP Eduardo Connelly        Subjective:   Ramsey Avendano mEq, Intravenous, Once    •  [COMPLETED] potassium chloride IVPB premix 20 mEq, 20 mEq, Intravenous, Once    •  diphenhydrAMINE HCl (BENADRYL) IV PUSH injection 25 mg, 25 mg, Intravenous, Q4H PRN    •  [COMPLETED] potassium chloride 40 mEq in sodium chlori in sodium chloride 0.9% 250 mL IVPB, 40 mEq, Intravenous, Once    Followed by    •  [COMPLETED] potassium chloride IVPB premix 20 mEq, 20 mEq, Intravenous, Once    •  phenol (CHLORASEPTIC) 1.4 % oral liquid spray, , Oral, Q2H PRN    •  guaiFENesin-DM (ERNESTO swelling noted. Integument: No lesions. No erythema. Psychiatric: Appropriate mood and affect.         Assessment and Plan:     Small bowel obstruction ([address]  POD#9 s/p laparoscopic ANGIE    Small BM yesterday after Fleet enema    Allow trial of full liquid

## 2020-09-22 NOTE — PLAN OF CARE
Discussed plan of care for day, including all given medications and their indications. Patient with increased alertness noted today. Tolerating advancement to full liquid diet with increased intake noted. TPN and IV Zosyn maintained.   Alteplase instilled INTERVENTIONS:  - Encourage pt to monitor pain and request assistance  - Assess pain using appropriate pain scale  - Administer analgesics based on type and severity of pain and evaluate response  - Implement non-pharmacological measures as appropriate and effectiveness of GI medications  - Encourage mobilization and activity  - Obtain nutritional consult as needed  - Establish a toileting routine/schedule  - Consider collaborating with pharmacy to review patient's medication profile  Outcome: Progressing  G

## 2020-09-22 NOTE — PLAN OF CARE
Problem: Patient Centered Care  Goal: Patient preferences are identified and integrated in the patient's plan of care  Description: Interventions:  - What would you like us to know as we care for you?  Per daughter Kodak Bensondread patient had COVID in the spring a Free from fall injury  Description: INTERVENTIONS:  - Assess pt frequently for physical needs  - Identify cognitive and physical deficits and behaviors that affect risk of falls.   - [place] fall precautions as indicated by assessment.  - Educate pt/famil eating environment  Outcome: Progressing     Problem: HEMATOLOGIC - ADULT  Goal: Free from bleeding injury  Description: (Example usage: patient with low platelets)  INTERVENTIONS:  - Avoid intramuscular injections, enemas and rectal medication administrat

## 2020-09-23 ENCOUNTER — APPOINTMENT (OUTPATIENT)
Dept: GENERAL RADIOLOGY | Facility: HOSPITAL | Age: 85
DRG: 336 | End: 2020-09-23
Attending: HOSPITALIST
Payer: MEDICARE

## 2020-09-23 ENCOUNTER — APPOINTMENT (OUTPATIENT)
Dept: GENERAL RADIOLOGY | Facility: HOSPITAL | Age: 85
DRG: 336 | End: 2020-09-23
Attending: PHYSICIAN ASSISTANT
Payer: MEDICARE

## 2020-09-23 LAB
ANION GAP SERPL CALC-SCNC: 3 MMOL/L (ref 0–18)
BASOPHILS # BLD AUTO: 0.11 X10(3) UL (ref 0–0.2)
BASOPHILS NFR BLD AUTO: 1.3 %
BUN BLD-MCNC: 14 MG/DL (ref 7–18)
BUN/CREAT SERPL: 14.4 (ref 10–20)
CALCIUM BLD-MCNC: 8.6 MG/DL (ref 8.5–10.1)
CHLORIDE SERPL-SCNC: 107 MMOL/L (ref 98–112)
CO2 SERPL-SCNC: 29 MMOL/L (ref 21–32)
CREAT BLD-MCNC: 0.97 MG/DL
DEPRECATED RDW RBC AUTO: 45.8 FL (ref 35.1–46.3)
EOSINOPHIL # BLD AUTO: 0.47 X10(3) UL (ref 0–0.7)
EOSINOPHIL NFR BLD AUTO: 5.4 %
ERYTHROCYTE [DISTWIDTH] IN BLOOD BY AUTOMATED COUNT: 14.1 % (ref 11–15)
GLUCOSE BLD-MCNC: 127 MG/DL (ref 70–99)
HAV IGM SER QL: 2.2 MG/DL (ref 1.6–2.6)
HCT VFR BLD AUTO: 35.3 %
HGB BLD-MCNC: 11.4 G/DL
IMM GRANULOCYTES # BLD AUTO: 0.03 X10(3) UL (ref 0–1)
IMM GRANULOCYTES NFR BLD: 0.3 %
LYMPHOCYTES # BLD AUTO: 1.91 X10(3) UL (ref 1–4)
LYMPHOCYTES NFR BLD AUTO: 21.9 %
MCH RBC QN AUTO: 29 PG (ref 26–34)
MCHC RBC AUTO-ENTMCNC: 32.3 G/DL (ref 31–37)
MCV RBC AUTO: 89.8 FL
MONOCYTES # BLD AUTO: 0.62 X10(3) UL (ref 0.1–1)
MONOCYTES NFR BLD AUTO: 7.1 %
NEUTROPHILS # BLD AUTO: 5.57 X10 (3) UL (ref 1.5–7.7)
NEUTROPHILS # BLD AUTO: 5.57 X10(3) UL (ref 1.5–7.7)
NEUTROPHILS NFR BLD AUTO: 64 %
OSMOLALITY SERPL CALC.SUM OF ELEC: 290 MOSM/KG (ref 275–295)
PHOSPHATE SERPL-MCNC: 3.3 MG/DL (ref 2.5–4.9)
PLATELET # BLD AUTO: 322 10(3)UL (ref 150–450)
POTASSIUM SERPL-SCNC: 4 MMOL/L (ref 3.5–5.1)
RBC # BLD AUTO: 3.93 X10(6)UL
SODIUM SERPL-SCNC: 139 MMOL/L (ref 136–145)
WBC # BLD AUTO: 8.7 X10(3) UL (ref 4–11)

## 2020-09-23 PROCEDURE — 71045 X-RAY EXAM CHEST 1 VIEW: CPT | Performed by: HOSPITALIST

## 2020-09-23 PROCEDURE — 99232 SBSQ HOSP IP/OBS MODERATE 35: CPT | Performed by: HOSPITALIST

## 2020-09-23 PROCEDURE — 74019 RADEX ABDOMEN 2 VIEWS: CPT | Performed by: PHYSICIAN ASSISTANT

## 2020-09-23 RX ORDER — MAGNESIUM OXIDE 400 MG (241.3 MG MAGNESIUM) TABLET
400 TABLET
Status: DISCONTINUED | OUTPATIENT
Start: 2020-09-24 | End: 2020-09-26

## 2020-09-23 NOTE — WOUND PROGRESS NOTE
Wound Care Services  Consulted to assess the pt's sacrum. Spoke with the pt's nurse, The pt. is resting in bed, her daughter is at the bedside, she cares for the pt. at home. The pt. is upset, she wants to go home.  The pt. was turned in bed with the daught

## 2020-09-23 NOTE — PROGRESS NOTES
[facility]  Progress Note    Rian Roca Patient Status:  Inpatient    1935 MRN [id number]   Location [facility] 4W/SW/SE Attending Roman Camp MD   Hosp Day # 15 PCP Neyda Truong     Subjective:  Patient seen in follow up.  Sh pulses are 2+ bilateral pedal and radial sites. Neurologic: Alert and oriented, normal affect. Skin: Warm and dry. Laboratory/Data:  Diagnostics:     Echo 9/11/20  Study Conclusions  1. Left ventricle:  The cavity size was normal. Wall thickness was PRN    •  albuterol sulfate (VENTOLIN) (2.5 MG/3ML) 0.083% nebulizer solution 2.5 mg, 2.5 mg, Nebulization, TID    •  diphenhydrAMINE HCl (BENADRYL) IV PUSH injection 25 mg, 25 mg, Intravenous, Q4H PRN    •  HYDROmorphone HCl (DILAUDID) 1 MG/ML injection 0 patient     2. Possible acute bronchitis     3. Advanced dementia     4.  HTN  - controlled  - on amlodipine 10 mg daily    Plan: cardiac status stable continue on amlodipine 10 mg daily    IDANIA Mcnally Cardiovascular  9/23/2020  11:22 AM   Disc

## 2020-09-23 NOTE — PROGRESS NOTES
[facility]    Progress Note    Dajuan Ott Patient Status:  Inpatient    1935 MRN [id number]   Location [facility] 4W/SW/SE Attending Caden Green MD   [facility] Day # 15 PCP Michael Ross        Subjective:   Dajuan Ott mEq, Intravenous, Once    •  dextrose 10 % infusion, , Intravenous, Continuous PRN    •  [] adult 3 in 1 TPN, , Intravenous, Continuous TPN    •  benzocaine-menthol (CEPACOL (SUGAR-FREE)) 1 lozenge, 1 lozenge, Oral, PRN    •  albuterol sulfate (VENT infusion, , Intravenous, Continuous    •  Pantoprazole Sodium (PROTONIX) 40 mg in Sodium Chloride 0.9 % 10 mL IV push, 40 mg, Intravenous, Daily    •  Dorzolamide HCl-Timolol Mal (Dorzolamide-Timolol) 22.3-6.8 MG/ML ophthalmic solution 1 drop, 1 drop, Both I/O last 3 completed shifts: In: 2550 [P.O.:300; I.V.:40; IV PIGGYBACK:500]  Out: 2150 [Urine:2150]    General: No acute distress. Awake. HEENT: Moist mucous membranes. Abdomen: Soft, nontender, mildly distended. Positive bowel sounds.  Diffuse tende on 9/23/2020 at 2:16 PM     Finalized by (CST):  Chastity Payton MD on 9/23/2020 at 2:20 PM          Xr Abdomen Obstructive Series Routine(2 Vw)(cpt=74019)    Result Date: 9/23/2020  CONCLUSION:  Findings of a resolving small bowel obstruction are again n

## 2020-09-23 NOTE — PROGRESS NOTES
[facility]    Progress Note    Maynor Brasher Patient Status:  Inpatient    1935 MRN [id number]   Location [facility] 4W/SW/SE Attending Justice Rubinstein, MD   Hosp Day # 15 PCP Sheila Chiu       Subjective:     No feve none  code status: full code      Results:     Lab Results   Component Value Date    WBC 8.7 09/23/2020    HGB 11.4 (L) 09/23/2020    HCT 35.3 09/23/2020    .0 09/23/2020    CREATSERUM 0.97 09/23/2020    BUN 14 09/23/2020     09/23/2020    K 4

## 2020-09-23 NOTE — PLAN OF CARE
Plan of care reviewed with patient and her daughter. Patient up in chair with max assist this am. Obstructive series completed. Denies passing gas and has not had a BM this afternoon. Low appetite- continued on a full liquid diet.  No plans to advance diet non-pharmacological measures as appropriate and evaluate response  - Consider cultural and social influences on pain and pain management  - Manage/alleviate anxiety  - Utilize distraction and/or relaxation techniques  - Monitor for opioid side effects  - N preferences  - Enhance eating environment  Outcome: Progressing     Problem: HEMATOLOGIC - ADULT  Goal: Free from bleeding injury  Description: (Example usage: patient with low platelets)  INTERVENTIONS:  - Avoid intramuscular injections, enemas and rectal

## 2020-09-23 NOTE — PLAN OF CARE
Problem: Patient Centered Care  Goal: Patient preferences are identified and integrated in the patient's plan of care  Description: Interventions:  - What would you like us to know as we care for you?  Per daughter Susan Marroquin patient had COVID in the spring a Progressing     Problem: SAFETY ADULT - FALL  Goal: Free from fall injury  Description: INTERVENTIONS:  - Assess pt frequently for physical needs  - Identify cognitive and physical deficits and behaviors that affect risk of falls.   - [place] fall precaut from home; allow for food preferences  - Enhance eating environment  Outcome: Progressing     Problem: HEMATOLOGIC - ADULT  Goal: Free from bleeding injury  Description: (Example usage: patient with low platelets)  INTERVENTIONS:  - Avoid intramuscular inj

## 2020-09-24 LAB
ANION GAP SERPL CALC-SCNC: 5 MMOL/L (ref 0–18)
BASOPHILS # BLD AUTO: 0.14 X10(3) UL (ref 0–0.2)
BASOPHILS NFR BLD AUTO: 1.7 %
BUN BLD-MCNC: 13 MG/DL (ref 7–18)
BUN/CREAT SERPL: 14.9 (ref 10–20)
CALCIUM BLD-MCNC: 8.9 MG/DL (ref 8.5–10.1)
CHLORIDE SERPL-SCNC: 107 MMOL/L (ref 98–112)
CO2 SERPL-SCNC: 29 MMOL/L (ref 21–32)
CREAT BLD-MCNC: 0.87 MG/DL
DEPRECATED RDW RBC AUTO: 47.4 FL (ref 35.1–46.3)
EOSINOPHIL # BLD AUTO: 0.79 X10(3) UL (ref 0–0.7)
EOSINOPHIL NFR BLD AUTO: 9.7 %
ERYTHROCYTE [DISTWIDTH] IN BLOOD BY AUTOMATED COUNT: 14.3 % (ref 11–15)
GLUCOSE BLD-MCNC: 106 MG/DL (ref 70–99)
HAV IGM SER QL: 2.2 MG/DL (ref 1.6–2.6)
HCT VFR BLD AUTO: 36.2 %
HGB BLD-MCNC: 11.6 G/DL
IMM GRANULOCYTES # BLD AUTO: 0.03 X10(3) UL (ref 0–1)
IMM GRANULOCYTES NFR BLD: 0.4 %
LYMPHOCYTES # BLD AUTO: 1.97 X10(3) UL (ref 1–4)
LYMPHOCYTES NFR BLD AUTO: 24.3 %
MCH RBC QN AUTO: 29.1 PG (ref 26–34)
MCHC RBC AUTO-ENTMCNC: 32 G/DL (ref 31–37)
MCV RBC AUTO: 91 FL
MONOCYTES # BLD AUTO: 0.62 X10(3) UL (ref 0.1–1)
MONOCYTES NFR BLD AUTO: 7.6 %
NEUTROPHILS # BLD AUTO: 4.56 X10 (3) UL (ref 1.5–7.7)
NEUTROPHILS # BLD AUTO: 4.56 X10(3) UL (ref 1.5–7.7)
NEUTROPHILS NFR BLD AUTO: 56.3 %
OSMOLALITY SERPL CALC.SUM OF ELEC: 293 MOSM/KG (ref 275–295)
PHOSPHATE SERPL-MCNC: 3.3 MG/DL (ref 2.5–4.9)
PLATELET # BLD AUTO: 333 10(3)UL (ref 150–450)
POTASSIUM SERPL-SCNC: 4.4 MMOL/L (ref 3.5–5.1)
RBC # BLD AUTO: 3.98 X10(6)UL
SODIUM SERPL-SCNC: 141 MMOL/L (ref 136–145)
WBC # BLD AUTO: 8.1 X10(3) UL (ref 4–11)

## 2020-09-24 PROCEDURE — 99232 SBSQ HOSP IP/OBS MODERATE 35: CPT | Performed by: HOSPITALIST

## 2020-09-24 RX ORDER — PANTOPRAZOLE SODIUM 40 MG/1
40 TABLET, DELAYED RELEASE ORAL
Status: DISCONTINUED | OUTPATIENT
Start: 2020-09-25 | End: 2020-09-26

## 2020-09-24 NOTE — PLAN OF CARE
Plan of care reviewed with patient and her daughter. Patient had a large BM today. Advanced to a general diet and ate well this morning. Patient up in chair with max assistance and put back in bed with a lift. PRN tylenol given for pain management.  [place] cultural and social influences on pain and pain management  - Manage/alleviate anxiety  - Utilize distraction and/or relaxation techniques  - Monitor for opioid side effects  - Notify MD/LIP if interventions unsuccessful or patient reports new pain  - Anti nutritional intake (undernourished)  Description: INTERVENTIONS:  - Monitor percentage of each meal consumed  - Identify factors contributing to decreased intake, treat as appropriate  - Assist with meals as needed  - Monitor I&O, WT and lab values  - Obta

## 2020-09-24 NOTE — PROGRESS NOTES
[facility]    [facility] Note    Mikayla Dexter Patient Status:  Inpatient    1935 MRN [id number]   Location [facility] 4W/SW/SE Attending Mert Mcgregor MD   [facility] Day # 16 PCP Marina Norton       Subjective:   Mikayla Dexter 04/04/2020       Current Medications:    •  [START ON 9/25/2020] Pantoprazole Sodium (PROTONIX) EC tab 40 mg, 40 mg, Oral, QAM AC    •  adult 3 in 1 TPN, , Intravenous, Continuous TPN    •  alteplase (ACTIVASE) 2 mg in Sterile Water for Injection 2.2 mL IV Metoclopramide HCl (REGLAN) injection 5 mg, 5 mg, Intravenous, Q8H PRN    •  acetaminophen (OFIRMEV) infusion 1,000 mg, 1,000 mg, Intravenous, Q6H PRN    •  Acidophilus/Pectin (PROBIOTIC) CAPS 1 capsule, 1 capsule, Oral, TID    •  simethicone (MYLICON) leida Dictated by (CST): Jennifer Salmeron MD on 9/23/2020 at 2:16 PM     Finalized by (CST):  Jennifer Salmeron MD on 9/23/2020 at 2:20 PM          Xr Abdomen Obstructive Series Routine(2 Vw)(cpt=74019)    Result Date: 9/23/2020  CONCLUSION:  [facility]  [address] [facility] [facility] [address]  [facility]  [phone]

## 2020-09-24 NOTE — PLAN OF CARE
Problem: ALTERED NUTRIENT INTAKE - ADULT  Goal: Nutrient intake appropriate for improving, restoring or maintaining nutritional needs  Description: INTERVENTIONS:  - Assess nutritional status and recommend course of action  - Monitor oral intake, labs, a

## 2020-09-24 NOTE — PROGRESS NOTES
[facility]    Progress Note    Ellen Harris Patient Status:  Inpatient    1935 MRN [id number]   Location [facility] 4W/SW/SE Attending Danica Hadley MD   Hosp Day # 16 PCP Xi Becker       Subjective:     Patient 09/24/2020    CA 8.9 09/24/2020    ALB 2.5 (L) 09/17/2020    ALKPHO 73 09/17/2020    BILT 0.5 09/17/2020    TP 6.7 09/17/2020    AST 19 09/17/2020    ALT 12 (L) 09/17/2020    LIP 89 09/07/2020    CRP 1.22 (H) 03/20/2020    MG 2.2 09/24/2020    PHOS 3.3 09/

## 2020-09-24 NOTE — DIETARY NOTE
ADULT NUTRITION REASSESSMENT     Pt is at high nutrition risk due to TPN. Pt does not meet malnutrition criteria. RECOMMENDATIONS TO MD:  See Nutrition Intervention  weaning TPN as diet advanced to solids and eating >50% of meals.           Jj García assess education needs  - Coordination of nutrition care: collaboration with other providers  - Discharge and transfer of nutrition care to new setting or provider: monitor plans    ADMITTING DIAGNOSIS:   Small bowel obstruction     PAST MEDICAL HISTORY Units Subcutaneous 2 times per day   • Acidophilus/Pectin  1 capsule Oral TID   • simethicone  80 mg Oral TID     LABS: reviewed. TPN additives based on below.    Recent Labs     09/21/20  1226 09/23/20  0810 09/24/20  0537   * 127* 106*   BUN 15 14

## 2020-09-24 NOTE — PLAN OF CARE
Patient oriented to self only. TPN infusing through Double Lumen L-PICC. No blood return noted in PICC. General diet- low appetite. Refused night time PO meds. Incontinent - purewick in place. On remote tele with no calls overnight.  Fall precautions in [place] cultural and social influences on pain and pain management  - Manage/alleviate anxiety  - Utilize distraction and/or relaxation techniques  - Monitor for opioid side effects  - Notify MD/LIP if interventions unsuccessful or patient reports new pain  - Anti nutritional intake (undernourished)  Description: INTERVENTIONS:  - Monitor percentage of each meal consumed  - Identify factors contributing to decreased intake, treat as appropriate  - Assist with meals as needed  - Monitor I&O, WT and lab values  - Obta

## 2020-09-25 LAB
ANION GAP SERPL CALC-SCNC: 5 MMOL/L (ref 0–18)
BASOPHILS # BLD AUTO: 0.14 X10(3) UL (ref 0–0.2)
BASOPHILS NFR BLD AUTO: 1.6 %
BUN BLD-MCNC: 17 MG/DL (ref 7–18)
BUN/CREAT SERPL: 19.1 (ref 10–20)
CALCIUM BLD-MCNC: 9 MG/DL (ref 8.5–10.1)
CHLORIDE SERPL-SCNC: 109 MMOL/L (ref 98–112)
CO2 SERPL-SCNC: 28 MMOL/L (ref 21–32)
CREAT BLD-MCNC: 0.89 MG/DL
DEPRECATED RDW RBC AUTO: 48.9 FL (ref 35.1–46.3)
EOSINOPHIL # BLD AUTO: 0.78 X10(3) UL (ref 0–0.7)
EOSINOPHIL NFR BLD AUTO: 8.9 %
ERYTHROCYTE [DISTWIDTH] IN BLOOD BY AUTOMATED COUNT: 14.4 % (ref 11–15)
GLUCOSE BLD-MCNC: 107 MG/DL (ref 70–99)
HAV IGM SER QL: 2.2 MG/DL (ref 1.6–2.6)
HCT VFR BLD AUTO: 41 %
HGB BLD-MCNC: 13 G/DL
IMM GRANULOCYTES # BLD AUTO: 0.03 X10(3) UL (ref 0–1)
IMM GRANULOCYTES NFR BLD: 0.3 %
LYMPHOCYTES # BLD AUTO: 1.8 X10(3) UL (ref 1–4)
LYMPHOCYTES NFR BLD AUTO: 20.5 %
MCH RBC QN AUTO: 29.2 PG (ref 26–34)
MCHC RBC AUTO-ENTMCNC: 31.7 G/DL (ref 31–37)
MCV RBC AUTO: 92.1 FL
MONOCYTES # BLD AUTO: 0.6 X10(3) UL (ref 0.1–1)
MONOCYTES NFR BLD AUTO: 6.8 %
NEUTROPHILS # BLD AUTO: 5.42 X10 (3) UL (ref 1.5–7.7)
NEUTROPHILS # BLD AUTO: 5.42 X10(3) UL (ref 1.5–7.7)
NEUTROPHILS NFR BLD AUTO: 61.9 %
OSMOLALITY SERPL CALC.SUM OF ELEC: 296 MOSM/KG (ref 275–295)
PHOSPHATE SERPL-MCNC: 3.4 MG/DL (ref 2.5–4.9)
PLATELET # BLD AUTO: 363 10(3)UL (ref 150–450)
POTASSIUM SERPL-SCNC: 4.4 MMOL/L (ref 3.5–5.1)
RBC # BLD AUTO: 4.45 X10(6)UL
SODIUM SERPL-SCNC: 142 MMOL/L (ref 136–145)
WBC # BLD AUTO: 8.8 X10(3) UL (ref 4–11)

## 2020-09-25 PROCEDURE — 99232 SBSQ HOSP IP/OBS MODERATE 35: CPT | Performed by: HOSPITALIST

## 2020-09-25 RX ORDER — VANCOMYCIN HYDROCHLORIDE 125 MG/1
125 CAPSULE ORAL DAILY
Status: DISCONTINUED | OUTPATIENT
Start: 2020-09-26 | End: 2020-09-26

## 2020-09-25 NOTE — PROGRESS NOTES
[facility]    Progress Note    Maria Tijerina Patient Status:  Inpatient    1935 MRN [id number]   Location [facility] 4W/SW/SE Attending Lance Hubbard MD   Hosp Day # 16 PCP Xi Alfonso       Subjective:     Patient ALKPHO 73 09/17/2020    BILT 0.5 09/17/2020    TP 6.7 09/17/2020    AST 19 09/17/2020    ALT 12 (L) 09/17/2020    LIP 89 09/07/2020    CRP 1.22 (H) 03/20/2020    MG 2.2 09/25/2020    PHOS 3.4 09/25/2020    TROP <0.045 04/04/2020       Xr Chest Ap Portable

## 2020-09-25 NOTE — PLAN OF CARE
Problem: Patient Centered Care  Goal: Patient preferences are identified and integrated in the patient's plan of care  Description: Interventions:  - What would you like us to know as we care for you?  Per daughter Yu Bell patient had COVID in the spring a Progressing     Problem: SAFETY ADULT - FALL  Goal: Free from fall injury  Description: INTERVENTIONS:  - Assess pt frequently for physical needs  - Identify cognitive and physical deficits and behaviors that affect risk of falls.   - [place] fall precaut from home; allow for food preferences  - Enhance eating environment  Outcome: Progressing     Problem: HEMATOLOGIC - ADULT  Goal: Free from bleeding injury  Description: (Example usage: patient with low platelets)  INTERVENTIONS:  - Avoid intramuscular inj as indicated  Outcome: Progressing  Goal: Oral mucous membranes remain intact  Description: INTERVENTIONS  - Assess oral mucosa and hygiene practices  - Implement preventative oral hygiene regimen  - Implement oral medicated treatments as ordered  Outcome:

## 2020-09-25 NOTE — PROGRESS NOTES
[facility]    Progress Note    Wanda Valenzuela Patient Status:  Inpatient    1935 MRN [id number]   Location [facility] 4W/SW/SE Attending Cary Hill MD   [facility] Day # 16 PCP Jenise Rodriguez        Subjective:   Wanda Valenzuela oral solution 125 mg, 125 mg, Oral, Daily    •  [] adult 3 in 1 TPN, , Intravenous, Continuous TPN    •  [] adult 3 in 1 TPN, , Intravenous, Continuous TPN    •  [COMPLETED] potassium chloride 40 mEq in sodium chloride 0.9% 250 mL IVPB, 40 mE Intravenous, Once    •  amLODIPine Besylate (NORVASC) tab 10 mg, 10 mg, Oral, Daily    •  [COMPLETED] HYDROmorphone HCl (DILAUDID) 1 MG/ML injection 0.3 mg, 0.3 mg, Intravenous, Once    •  [COMPLETED] dextrose 50 % injection, , ,     •  dextrose 5 %-0.45 % temperature 98.3 °F (36.8 °C), temperature source Oral, resp. rate 18, height 65\", weight 134 lb 4.8 oz (60.9 kg), SpO2 98 %, not currently breastfeeding. I/O last 3 completed shifts:   In: 320 [P.O.:320]  Out: 1950 [Urine:1950]    General: No acute di The rest of the findings are stable from September 14, 2020. 3. Cardiomegaly. 4. Atherosclerosis. 5. Pleural and parenchymal scarring. 6. Postop changes right hemithorax. 7. Demineralization. 8. Scoliosis. 9. Osteoarthritis.  10. Status post cholecystectomy

## 2020-09-25 NOTE — PLAN OF CARE
Problem: Patient Centered Care  Goal: Patient preferences are identified and integrated in the patient's plan of care  Description: Interventions:  - What would you like us to know as we care for you?  Per daughter Kendell Abraham patient had COVID in the spring a Progressing     Problem: SAFETY ADULT - FALL  Goal: Free from fall injury  Description: INTERVENTIONS:  - Assess pt frequently for physical needs  - Identify cognitive and physical deficits and behaviors that affect risk of falls.   - [place] fall precaut from home; allow for food preferences  - Enhance eating environment  Outcome: Progressing     Problem: HEMATOLOGIC - ADULT  Goal: Free from bleeding injury  Description: (Example usage: patient with low platelets)  INTERVENTIONS:  - Avoid intramuscular inj as indicated  Outcome: Progressing  Goal: Oral mucous membranes remain intact  Description: INTERVENTIONS  - Assess oral mucosa and hygiene practices  - Implement preventative oral hygiene regimen  - Implement oral medicated treatments as ordered  Outcome:

## 2020-09-25 NOTE — OCCUPATIONAL THERAPY NOTE
OCCUPATIONAL THERAPY TREATMENT NOTE - INPATIENT        Room Number: 443/443-A           Presenting Problem: SBO    Problem List  Principal Problem:    Small bowel obstruction ([address]      OCCUPATIONAL THERAPY ASSESSMENT     Pt seen up in chair , PPE worn by PAIN ASSESSMENT  Rating: Unable to rate  Location: (bottom of LINDSAY feet, nursing aware)  Management Techniques: Relaxation;Repositioning     ACTIVITY TOLERANCE                         O2 SATURATIONS                ACTIVITIES OF DAILY LIVING ASSESSMENT for 3 minutes in prep for adls with SBA    Comment: pt tolerated standing 20 seconds with max x 2, cues for posture, cues for hand placement on RW      Comment:            Goals  on: 2020  Frequency: 3-5x/wk (pt from home)

## 2020-09-25 NOTE — PROGRESS NOTES
[facility]    Progress Note    Beulah Combs Patient Status:  Inpatient    1935 MRN [id number]   Location [facility] 4W/SW/SE Attending Lb Casas MD   [facility] Day # 16 PCP John Steinberg       Subjective:   Beulah Combs Medications:    •  Pantoprazole Sodium (PROTONIX) EC tab 40 mg, 40 mg, Oral, QAM AC    •  alteplase (ACTIVASE) 2 mg in Sterile Water for Injection 2.2 mL IV push, 2 mg, Intravenous, PRN    •  magnesium oxide (MAG-OX) tab 400 mg, 400 mg, Oral, TID CC    • infusion 1,000 mg, 1,000 mg, Intravenous, Q6H PRN    •  Acidophilus/Pectin (PROBIOTIC) CAPS 1 capsule, 1 capsule, Oral, TID    •  simethicone (MYLICON) chewable tab 80 mg, 80 mg, Oral, TID        •  acetaminophen (TYLENOL EXTRA STRENGTH) 500 MG Oral Tab, T sclerosis without stenosis. Moderate     regurgitation. 3. Mitral valve: Mildly calcified annulus. Mildly thickened, mildly     calcified leaflets. Moderate regurgitation. 4. Tricuspid valve: Moderate regurgitation. 5. Impressions:  The right ventricular

## 2020-09-25 NOTE — PHYSICAL THERAPY NOTE
PHYSICAL THERAPY TREATMENT NOTE - INPATIENT     Room Number: 443/443-A       Presenting Problem: SBO s/p laparoscopic lysis of adhesions 9/12    Problem List  Principal Problem:    Small bowel obstruction ([address]      PHYSICAL THERAPY ASSESSMENT     Pt seen another person does the patient currently need. ..   -   Moving to and from a bed to a chair (including a wheelchair)?: A Lot   -   Need to walk in hospital room?: A Lot   -   Climbing 3-5 steps with a railing?: Total     AM-PAC Score:  Raw Score: 11   Appr

## 2020-09-26 VITALS
TEMPERATURE: 99 F | SYSTOLIC BLOOD PRESSURE: 120 MMHG | OXYGEN SATURATION: 96 % | DIASTOLIC BLOOD PRESSURE: 61 MMHG | RESPIRATION RATE: 18 BRPM | BODY MASS INDEX: 23.25 KG/M2 | WEIGHT: 139.56 LBS | HEART RATE: 87 BPM | HEIGHT: 65 IN

## 2020-09-26 PROCEDURE — 99239 HOSP IP/OBS DSCHRG MGMT >30: CPT | Performed by: HOSPITALIST

## 2020-09-26 RX ORDER — ALBUTEROL SULFATE 90 UG/1
1 AEROSOL, METERED RESPIRATORY (INHALATION)
Qty: 1 INHALER | Refills: 0 | Status: SHIPPED | OUTPATIENT
Start: 2020-09-26

## 2020-09-26 RX ORDER — METOPROLOL TARTRATE 50 MG/1
25 TABLET, FILM COATED ORAL 2 TIMES DAILY
Qty: 30 TABLET | Refills: 0 | Status: SHIPPED | OUTPATIENT
Start: 2020-09-26 | End: 2020-10-26

## 2020-09-26 RX ORDER — ACETAMINOPHEN 325 MG/1
650 TABLET ORAL EVERY 6 HOURS PRN
Refills: 0 | Status: SHIPPED | COMMUNITY
Start: 2020-09-26

## 2020-09-26 NOTE — PLAN OF CARE
Patient is alert to self, on room air, max assist. Sling required to transfer patient to chair. On a general diet with very low appetite. Monitor PO intake. TPN stopped 09/25. On remote tele with no calls overnight.  L-double lumen PICC flushed and no blood appropriate  - Communicate ordered activity level and limitations with patient/family  Outcome: Progressing  Goal: Maintain proper alignment of affected body part  Description: INTERVENTIONS:  - Support and protect limb and body alignment per provider's or analgesics based on type and severity of pain and evaluate response  - Implement non-pharmacological measures as appropriate and evaluate response  - Consider cultural and social influences on pain and pain management  - Manage/alleviate anxiety  - Utilize

## 2020-09-26 NOTE — PROGRESS NOTES
[facility]    Progress Note    Giovanny Ellis Patient Status:  Inpatient    1935 MRN [id number]   Location [facility] 4W/SW/SE Attending Alba Daniels MD   [facility] Day # 25 PCP Lisa Ramirez        Subjective:   Giovanny Ellis enema, Rectal, Once PRN    •  [] adult 3 in 1 TPN, , Intravenous, Continuous TPN    •  [] adult 3 in 1 TPN, , Intravenous, Continuous TPN    •  [] adult 3 in 1 TPN, , Intravenous, Continuous TPN    •  [COMPLETED] potassium chloride 40 sodium chloride 0.9% 250 mL IVPB, 40 mEq, Intravenous, Once    •  amLODIPine Besylate (NORVASC) tab 10 mg, 10 mg, Oral, Daily    •  [COMPLETED] HYDROmorphone HCl (DILAUDID) 1 MG/ML injection 0.3 mg, 0.3 mg, Intravenous, Once    •  [COMPLETED] dextrose 50 % Signs: Blood pressure 120/61, pulse 87, temperature 98.6 °F (37 °C), temperature source Axillary, resp. rate 18, height 5' 5\" (1.651 m), weight 139 lb 9 oz (63.3 kg), SpO2 96 %, not currently breastfeeding. I/O last 3 completed shifts: In: 12 [P. O

## 2020-09-26 NOTE — CM/SW NOTE
09/26/20 1300   Discharge disposition   Expected discharge disposition Home-Health   Name of Usama Huff 69   Discharge transportation Private car   MDO received for discharge,  Patient is discharging with family and Home Healt

## 2020-09-26 NOTE — PLAN OF CARE
Patient irritable this shift, anxious to go home. Eating minimal at this time, daughter hoping PO intake will increase once she gets home. Pt denies pain. Remote tele in place, no calls. Pt max assist. Ok to d/c home from medical/surgical standpoint.  Discu adequate comfort level or patient's stated pain goal  Description: INTERVENTIONS:  - Encourage pt to monitor pain and request assistance  - Assess pain using appropriate pain scale  - Administer analgesics based on type and severity of pain and evaluate re bowel function  - Maintain adequate hydration with IV or PO as ordered and tolerated  - Evaluate effectiveness of GI medications  - Encourage mobilization and activity  - Obtain nutritional consult as needed  - Establish a toileting routine/schedule  - Con INTEGRITY - ADULT  Goal: Skin integrity remains intact  Description: INTERVENTIONS  - Assess and document risk factors for pressure ulcer development  - Assess and document skin integrity  - Monitor for areas of redness and/or skin breakdown  - Initiate in precautions)  Outcome: Adequate for Discharge

## 2020-09-27 NOTE — PAYOR COMM NOTE
--------------  DISCHARGE REVIEW    Gerardo Andujar MA [facility]  Subscriber #:  [id number]  Authorization Number: [id number]    Admit date: 9/8/20  Admit time:  1316  Discharge Date: 9/26/2020  2:06 PM     Admitting Physician:   Attending Physician:  Jessica Barron was very slow but she did eventually regain bowel function. Her appetite was poor, which was compounded by her advanced dementia. TPN was weaned off.   Although the patient was not eating every meal, her daughter felt that she would not improve until she tablets (25 mg total) by mouth 2 (two) times daily.    Stop taking on: October 26, 2020  Quantity: 30 tablet  Refills: 0        CONTINUE taking these medications      Instructions Prescription details   Albuterol Sulfate  (90 Base) MCG/ACT Aers than 30 minutes spent on discharge.  -----------------------    Hospital Discharge Diagnoses: Small bowel obstruction. Lace+ Score: 67  59-90 High Risk  29-58 Medium Risk  0-28   Low Risk. TCM Follow-Up Recommendation:  LACE > 58:  High Risk of readm

## 2022-02-27 ENCOUNTER — HOSPITAL ENCOUNTER (EMERGENCY)
Facility: HOSPITAL | Age: 87
Discharge: HOME | End: 2022-02-27
Attending: STUDENT IN AN ORGANIZED HEALTH CARE EDUCATION/TRAINING PROGRAM
Payer: MEDICARE

## 2022-02-27 VITALS
SYSTOLIC BLOOD PRESSURE: 156 MMHG | RESPIRATION RATE: 18 BRPM | HEART RATE: 70 BPM | HEIGHT: 66 IN | TEMPERATURE: 99 F | OXYGEN SATURATION: 98 % | DIASTOLIC BLOOD PRESSURE: 66 MMHG | BODY MASS INDEX: 23 KG/M2

## 2022-02-27 DIAGNOSIS — M25.472 ANKLE EDEMA, BILATERAL: ICD-10-CM

## 2022-02-27 DIAGNOSIS — L50.9 URTICARIA: Primary | ICD-10-CM

## 2022-02-27 DIAGNOSIS — M25.471 ANKLE EDEMA, BILATERAL: ICD-10-CM

## 2022-02-27 LAB
ALBUMIN SERPL-MCNC: 3.6 G/DL (ref 3.4–5)
ALP LIVER SERPL-CCNC: 109 U/L
ALT SERPL-CCNC: 7 U/L
ANION GAP SERPL CALC-SCNC: 4 MMOL/L (ref 0–18)
AST SERPL-CCNC: 18 U/L (ref 15–37)
BASOPHILS # BLD AUTO: 0.06 X10(3) UL (ref 0–0.2)
BASOPHILS NFR BLD AUTO: 0.7 %
BILIRUB DIRECT SERPL-MCNC: 0.2 MG/DL (ref 0–0.2)
BILIRUB SERPL-MCNC: 0.9 MG/DL (ref 0.1–2)
BUN BLD-MCNC: 13 MG/DL (ref 7–18)
BUN/CREAT SERPL: 11.4 (ref 10–20)
CALCIUM BLD-MCNC: 9.1 MG/DL (ref 8.5–10.1)
CHLORIDE SERPL-SCNC: 105 MMOL/L (ref 98–112)
CO2 SERPL-SCNC: 29 MMOL/L (ref 21–32)
CREAT BLD-MCNC: 1.14 MG/DL
DEPRECATED RDW RBC AUTO: 43.5 FL (ref 35.1–46.3)
EOSINOPHIL NFR BLD AUTO: 1.2 %
ERYTHROCYTE [DISTWIDTH] IN BLOOD BY AUTOMATED COUNT: 12.9 % (ref 11–15)
GLUCOSE BLD-MCNC: 68 MG/DL (ref 70–99)
HCT VFR BLD AUTO: 49.6 %
HGB BLD-MCNC: 15.1 G/DL
IMM GRANULOCYTES # BLD AUTO: 0.02 X10(3) UL (ref 0–1)
IMM GRANULOCYTES NFR BLD: 0.2 %
LYMPHOCYTES NFR BLD AUTO: 15 %
MCH RBC QN AUTO: 28 PG (ref 26–34)
MCHC RBC AUTO-ENTMCNC: 30.4 G/DL (ref 31–37)
MCV RBC AUTO: 92 FL
MONOCYTES # BLD AUTO: 0.58 X10(3) UL (ref 0.1–1)
MONOCYTES NFR BLD AUTO: 6.4 %
NEUTROPHILS # BLD AUTO: 6.93 X10 (3) UL (ref 1.5–7.7)
NEUTROPHILS # BLD AUTO: 6.93 X10(3) UL (ref 1.5–7.7)
NEUTROPHILS NFR BLD AUTO: 76.5 %
OSMOLALITY SERPL CALC.SUM OF ELEC: 284 MOSM/KG (ref 275–295)
PLATELET # BLD AUTO: 210 10(3)UL (ref 150–450)
POTASSIUM SERPL-SCNC: 4.1 MMOL/L (ref 3.5–5.1)
PROT SERPL-MCNC: 8.2 G/DL (ref 6.4–8.2)
RBC # BLD AUTO: 5.39 X10(6)UL
SODIUM SERPL-SCNC: 138 MMOL/L (ref 136–145)
WBC # BLD AUTO: 9.1 X10(3) UL (ref 4–11)

## 2022-02-27 PROCEDURE — 80076 HEPATIC FUNCTION PANEL: CPT | Performed by: STUDENT IN AN ORGANIZED HEALTH CARE EDUCATION/TRAINING PROGRAM

## 2022-02-27 PROCEDURE — 36415 COLL VENOUS BLD VENIPUNCTURE: CPT

## 2022-02-27 PROCEDURE — 99283 EMERGENCY DEPT VISIT LOW MDM: CPT

## 2022-02-27 PROCEDURE — 80048 BASIC METABOLIC PNL TOTAL CA: CPT | Performed by: STUDENT IN AN ORGANIZED HEALTH CARE EDUCATION/TRAINING PROGRAM

## 2022-02-27 PROCEDURE — 85025 COMPLETE CBC W/AUTO DIFF WBC: CPT | Performed by: STUDENT IN AN ORGANIZED HEALTH CARE EDUCATION/TRAINING PROGRAM

## 2022-02-27 RX ORDER — DIAPER,BRIEF,INFANT-TODD,DISP
1 EACH MISCELLANEOUS 2 TIMES DAILY
Qty: 1 EACH | Refills: 0 | Status: SHIPPED | OUTPATIENT
Start: 2022-02-27 | End: 2022-03-06

## 2022-02-28 NOTE — ED INITIAL ASSESSMENT (HPI)
Patient presents with discoloration on right arm/redness and blisters. Daughters noticed right arm today. Patient has edema in bilateral lower ext at baseline.

## 2022-06-28 ENCOUNTER — APPOINTMENT (OUTPATIENT)
Dept: ULTRASOUND IMAGING | Facility: HOSPITAL | Age: 87
End: 2022-06-28
Attending: EMERGENCY MEDICINE
Payer: MEDICARE

## 2022-06-28 ENCOUNTER — HOSPITAL ENCOUNTER (INPATIENT)
Facility: HOSPITAL | Age: 87
LOS: 3 days | Discharge: HOME | End: 2022-07-01
Attending: EMERGENCY MEDICINE
Payer: MEDICARE

## 2022-06-28 ENCOUNTER — HOSPITAL ENCOUNTER (INPATIENT)
Facility: HOSPITAL | Age: 87
LOS: 3 days | Discharge: HOME HEALTH | End: 2022-07-01
Attending: EMERGENCY MEDICINE | Admitting: HOSPITALIST
Payer: MEDICARE

## 2022-06-28 DIAGNOSIS — I82.412 ACUTE DEEP VEIN THROMBOSIS (DVT) OF FEMORAL VEIN OF LEFT LOWER EXTREMITY (HCC): Primary | ICD-10-CM

## 2022-06-28 LAB
ANION GAP SERPL CALC-SCNC: 3 MMOL/L (ref 0–18)
ANION GAP SERPL CALC-SCNC: 7 MMOL/L (ref 0–18)
APTT PPP: 221.2 SECONDS (ref 23.3–35.6)
APTT PPP: 27.9 SECONDS (ref 23.3–35.6)
BASOPHILS # BLD AUTO: 0.05 X10(3) UL (ref 0–0.2)
BASOPHILS NFR BLD AUTO: 0.6 %
BUN BLD-MCNC: 13 MG/DL (ref 7–18)
BUN BLD-MCNC: 13 MG/DL (ref 7–18)
BUN/CREAT SERPL: 14.6 (ref 10–20)
BUN/CREAT SERPL: 15.7 (ref 10–20)
CALCIUM BLD-MCNC: 8.6 MG/DL (ref 8.5–10.1)
CALCIUM BLD-MCNC: 8.6 MG/DL (ref 8.5–10.1)
CHLORIDE SERPL-SCNC: 107 MMOL/L (ref 98–112)
CHLORIDE SERPL-SCNC: 108 MMOL/L (ref 98–112)
CO2 SERPL-SCNC: 27 MMOL/L (ref 21–32)
CO2 SERPL-SCNC: 28 MMOL/L (ref 21–32)
CREAT BLD-MCNC: 0.83 MG/DL
CREAT BLD-MCNC: 0.89 MG/DL
DEPRECATED RDW RBC AUTO: 45.5 FL (ref 35.1–46.3)
DEPRECATED RDW RBC AUTO: 45.7 FL (ref 35.1–46.3)
EOSINOPHIL # BLD AUTO: 0.17 X10(3) UL (ref 0–0.7)
EOSINOPHIL NFR BLD AUTO: 2 %
ERYTHROCYTE [DISTWIDTH] IN BLOOD BY AUTOMATED COUNT: 13.3 % (ref 11–15)
ERYTHROCYTE [DISTWIDTH] IN BLOOD BY AUTOMATED COUNT: 13.3 % (ref 11–15)
GLUCOSE BLD-MCNC: 103 MG/DL (ref 70–99)
GLUCOSE BLD-MCNC: 87 MG/DL (ref 70–99)
HCT VFR BLD AUTO: 42.6 %
HCT VFR BLD AUTO: 45.4 %
HGB BLD-MCNC: 12.6 G/DL
HGB BLD-MCNC: 13.3 G/DL
IMM GRANULOCYTES # BLD AUTO: 0.03 X10(3) UL (ref 0–1)
IMM GRANULOCYTES NFR BLD: 0.4 %
LYMPHOCYTES # BLD AUTO: 1.27 X10(3) UL (ref 1–4)
LYMPHOCYTES NFR BLD AUTO: 15.1 %
MCH RBC QN AUTO: 27.4 PG (ref 26–34)
MCH RBC QN AUTO: 27.5 PG (ref 26–34)
MCHC RBC AUTO-ENTMCNC: 29.3 G/DL (ref 31–37)
MCHC RBC AUTO-ENTMCNC: 29.6 G/DL (ref 31–37)
MCV RBC AUTO: 93 FL
MCV RBC AUTO: 93.4 FL
MONOCYTES # BLD AUTO: 0.7 X10(3) UL (ref 0.1–1)
MONOCYTES NFR BLD AUTO: 8.3 %
NEUTROPHILS # BLD AUTO: 6.21 X10 (3) UL (ref 1.5–7.7)
NEUTROPHILS # BLD AUTO: 6.21 X10(3) UL (ref 1.5–7.7)
NEUTROPHILS NFR BLD AUTO: 73.6 %
OSMOLALITY SERPL CALC.SUM OF ELEC: 287 MOSM/KG (ref 275–295)
OSMOLALITY SERPL CALC.SUM OF ELEC: 292 MOSM/KG (ref 275–295)
PLATELET # BLD AUTO: 183 10(3)UL (ref 150–450)
PLATELET # BLD AUTO: 217 10(3)UL (ref 150–450)
POTASSIUM SERPL-SCNC: 3.9 MMOL/L (ref 3.5–5.1)
POTASSIUM SERPL-SCNC: 4.4 MMOL/L (ref 3.5–5.1)
RBC # BLD AUTO: 4.58 X10(6)UL
RBC # BLD AUTO: 4.86 X10(6)UL
SARS-COV-2 RNA RESP QL NAA+PROBE: NOT DETECTED
SODIUM SERPL-SCNC: 139 MMOL/L (ref 136–145)
SODIUM SERPL-SCNC: 141 MMOL/L (ref 136–145)
WBC # BLD AUTO: 7.5 X10(3) UL (ref 4–11)
WBC # BLD AUTO: 8.4 X10(3) UL (ref 4–11)

## 2022-06-28 PROCEDURE — 99222 1ST HOSP IP/OBS MODERATE 55: CPT | Performed by: HOSPITALIST

## 2022-06-28 PROCEDURE — 93971 EXTREMITY STUDY: CPT | Performed by: EMERGENCY MEDICINE

## 2022-06-28 RX ORDER — HEPARIN SODIUM AND DEXTROSE 10000; 5 [USP'U]/100ML; G/100ML
18 INJECTION INTRAVENOUS ONCE
Status: COMPLETED | OUTPATIENT
Start: 2022-06-28 | End: 2022-06-28

## 2022-06-28 RX ORDER — BISACODYL 10 MG
10 SUPPOSITORY, RECTAL RECTAL
Status: DISCONTINUED | OUTPATIENT
Start: 2022-06-28 | End: 2022-07-01

## 2022-06-28 RX ORDER — DORZOLAMIDE HYDROCHLORIDE AND TIMOLOL MALEATE 20; 5 MG/ML; MG/ML
1 SOLUTION/ DROPS OPHTHALMIC 2 TIMES DAILY
Status: DISCONTINUED | OUTPATIENT
Start: 2022-06-28 | End: 2022-07-01

## 2022-06-28 RX ORDER — HEPARIN SODIUM 1000 [USP'U]/ML
80 INJECTION, SOLUTION INTRAVENOUS; SUBCUTANEOUS ONCE
Status: COMPLETED | OUTPATIENT
Start: 2022-06-28 | End: 2022-06-28

## 2022-06-28 RX ORDER — SODIUM PHOSPHATE, DIBASIC AND SODIUM PHOSPHATE, MONOBASIC 7; 19 G/133ML; G/133ML
1 ENEMA RECTAL ONCE AS NEEDED
Status: DISCONTINUED | OUTPATIENT
Start: 2022-06-28 | End: 2022-07-01

## 2022-06-28 RX ORDER — METOCLOPRAMIDE HYDROCHLORIDE 5 MG/ML
10 INJECTION INTRAMUSCULAR; INTRAVENOUS EVERY 8 HOURS PRN
Status: DISCONTINUED | OUTPATIENT
Start: 2022-06-28 | End: 2022-06-29

## 2022-06-28 RX ORDER — ONDANSETRON 2 MG/ML
4 INJECTION INTRAMUSCULAR; INTRAVENOUS EVERY 6 HOURS PRN
Status: DISCONTINUED | OUTPATIENT
Start: 2022-06-28 | End: 2022-07-01

## 2022-06-28 RX ORDER — IPRATROPIUM BROMIDE AND ALBUTEROL SULFATE 2.5; .5 MG/3ML; MG/3ML
3 SOLUTION RESPIRATORY (INHALATION) EVERY 6 HOURS PRN
Status: DISCONTINUED | OUTPATIENT
Start: 2022-06-28 | End: 2022-07-01

## 2022-06-28 RX ORDER — SENNOSIDES 8.6 MG
17.2 TABLET ORAL NIGHTLY PRN
Status: DISCONTINUED | OUTPATIENT
Start: 2022-06-28 | End: 2022-07-01

## 2022-06-28 RX ORDER — LATANOPROST 50 UG/ML
1 SOLUTION/ DROPS OPHTHALMIC NIGHTLY
Status: DISCONTINUED | OUTPATIENT
Start: 2022-06-28 | End: 2022-07-01

## 2022-06-28 RX ORDER — ACETAMINOPHEN 500 MG
500 TABLET ORAL EVERY 6 HOURS PRN
COMMUNITY

## 2022-06-28 RX ORDER — POLYETHYLENE GLYCOL 3350 17 G/17G
17 POWDER, FOR SOLUTION ORAL DAILY PRN
Status: DISCONTINUED | OUTPATIENT
Start: 2022-06-28 | End: 2022-07-01

## 2022-06-28 RX ORDER — HEPARIN SODIUM AND DEXTROSE 10000; 5 [USP'U]/100ML; G/100ML
INJECTION INTRAVENOUS CONTINUOUS
Status: ACTIVE | OUTPATIENT
Start: 2022-06-28 | End: 2022-06-29

## 2022-06-28 RX ORDER — ACETAMINOPHEN 500 MG
500 TABLET ORAL EVERY 4 HOURS PRN
Status: DISCONTINUED | OUTPATIENT
Start: 2022-06-28 | End: 2022-07-01

## 2022-06-28 RX ORDER — MELATONIN
3 NIGHTLY PRN
Status: DISCONTINUED | OUTPATIENT
Start: 2022-06-28 | End: 2022-07-01

## 2022-06-28 NOTE — PLAN OF CARE
Patient received in bed from emergency room. Alert x 1, but non-verbal. Daughter reports that patient does talk. Vital signs taken and stable. Patient made comfortable in the bed. Heparin drip infusing at current time at rate of 9 ml/hr. Upon patient's arrival to unit heparin drip handoff was verified by second nurse at rate of 9 ml/hr. Hayley-care done on patient and diaper applied to patient and purewick was placed on patient. Activated PTT will be done at 5:20pm per protocol. Heparin drip was started in emergency room department at 11:20am. Pharmacy was informed of patient's corrected weight of 137.1 lbs that was entered in the computer and was different from emergency room weight. Per Pharmacist, no changes need to be made to rate at the current time until activated PTT is drawn at 5:20pm. Call light within reach. Daughter present at bedside. Fall risk precautions are followed at all times.

## 2022-06-28 NOTE — ED QUICK NOTES
Orders for admission, patient is aware of plan and ready to go upstairs.  Any questions, please call ED RN samreen at extension [phone]    Patient Covid vaccination status: Unvaccinated     COVID Test Ordered in ED: Rapid SARS-CoV-2 by PCR    COVID Suspicion at Admission: N/A    Running Infusions:  None    Mental Status/LOC at time of transport: x3    Other pertinent information:   CIWA score: N/A   NIH score:  N/A

## 2022-06-29 LAB
ALBUMIN SERPL-MCNC: 2.7 G/DL (ref 3.4–5)
ALBUMIN/GLOB SERPL: 0.7 {RATIO} (ref 1–2)
ALP LIVER SERPL-CCNC: 80 U/L
ALT SERPL-CCNC: 7 U/L
ANION GAP SERPL CALC-SCNC: 6 MMOL/L (ref 0–18)
APTT PPP: 113.6 SECONDS (ref 23.3–35.6)
APTT PPP: 90 SECONDS (ref 23.3–35.6)
AST SERPL-CCNC: 11 U/L (ref 15–37)
BASOPHILS # BLD AUTO: 0.08 X10(3) UL (ref 0–0.2)
BASOPHILS NFR BLD AUTO: 1.2 %
BILIRUB SERPL-MCNC: 0.6 MG/DL (ref 0.1–2)
BUN BLD-MCNC: 16 MG/DL (ref 7–18)
BUN/CREAT SERPL: 16.3 (ref 10–20)
CALCIUM BLD-MCNC: 8.3 MG/DL (ref 8.5–10.1)
CHLORIDE SERPL-SCNC: 108 MMOL/L (ref 98–112)
CO2 SERPL-SCNC: 28 MMOL/L (ref 21–32)
CREAT BLD-MCNC: 0.98 MG/DL
DEPRECATED RDW RBC AUTO: 45.3 FL (ref 35.1–46.3)
EOSINOPHIL # BLD AUTO: 0.26 X10(3) UL (ref 0–0.7)
EOSINOPHIL NFR BLD AUTO: 3.8 %
ERYTHROCYTE [DISTWIDTH] IN BLOOD BY AUTOMATED COUNT: 13.2 % (ref 11–15)
GLOBULIN PLAS-MCNC: 3.9 G/DL (ref 2.8–4.4)
GLUCOSE BLD-MCNC: 97 MG/DL (ref 70–99)
HCT VFR BLD AUTO: 38.4 %
HGB BLD-MCNC: 11.7 G/DL
IMM GRANULOCYTES # BLD AUTO: 0.03 X10(3) UL (ref 0–1)
IMM GRANULOCYTES NFR BLD: 0.4 %
LYMPHOCYTES # BLD AUTO: 1.73 X10(3) UL (ref 1–4)
LYMPHOCYTES NFR BLD AUTO: 25.2 %
MCH RBC QN AUTO: 28 PG (ref 26–34)
MCHC RBC AUTO-ENTMCNC: 30.5 G/DL (ref 31–37)
MCV RBC AUTO: 91.9 FL
MONOCYTES # BLD AUTO: 0.51 X10(3) UL (ref 0.1–1)
MONOCYTES NFR BLD AUTO: 7.4 %
NEUTROPHILS # BLD AUTO: 4.26 X10 (3) UL (ref 1.5–7.7)
NEUTROPHILS # BLD AUTO: 4.26 X10(3) UL (ref 1.5–7.7)
NEUTROPHILS NFR BLD AUTO: 62 %
OSMOLALITY SERPL CALC.SUM OF ELEC: 295 MOSM/KG (ref 275–295)
PLATELET # BLD AUTO: 193 10(3)UL (ref 150–450)
PLATELET # BLD AUTO: 193 10(3)UL (ref 150–450)
POTASSIUM SERPL-SCNC: 4.1 MMOL/L (ref 3.5–5.1)
PROT SERPL-MCNC: 6.6 G/DL (ref 6.4–8.2)
RBC # BLD AUTO: 4.18 X10(6)UL
SODIUM SERPL-SCNC: 142 MMOL/L (ref 136–145)
WBC # BLD AUTO: 6.9 X10(3) UL (ref 4–11)

## 2022-06-29 PROCEDURE — 99233 SBSQ HOSP IP/OBS HIGH 50: CPT | Performed by: HOSPITALIST

## 2022-06-29 RX ORDER — METOCLOPRAMIDE HYDROCHLORIDE 5 MG/ML
5 INJECTION INTRAMUSCULAR; INTRAVENOUS EVERY 8 HOURS PRN
Status: DISCONTINUED | OUTPATIENT
Start: 2022-06-29 | End: 2022-07-01

## 2022-06-29 RX ORDER — ENOXAPARIN SODIUM 100 MG/ML
1 INJECTION SUBCUTANEOUS EVERY 12 HOURS
Qty: 1 EACH | Refills: 0 | Status: SHIPPED | OUTPATIENT
Start: 2022-06-29 | End: 2022-07-01

## 2022-06-29 NOTE — PLAN OF CARE
Problem: Patient Centered Care  Goal: Patient preferences are identified and integrated in the patient's plan of care  Description: Interventions:  - What would you like us to know as we care for you?   - Provide timely, complete, and accurate information to patient/family  - Incorporate patient and family knowledge, values, beliefs, and cultural backgrounds into the planning and delivery of care  - Encourage patient/family to participate in care and decision-making at the level they choose  - Honor patient and family perspectives and choices  6/28/2022 2014 by Curry Thakur RN  Outcome: Progressing  6/28/2022 1457 by Curry Thakur RN  Outcome: Progressing     Problem: Patient/Family Goals  Goal: Patient/Family Long Term Goal  Description: Patient's Long Term Goal: Blood clot resolved! Interventions:  - Patient on heparin drip protocol per order.  - APTT is monitored per orders. - Bilateral lower extremity is elevated on pillow for safety and comfort. - See additional Care Plan goals for specific interventions  6/28/2022 2014 by Curry Thakur RN  Outcome: Progressing  6/28/2022 1457 by Curry Thakur RN  Outcome: Progressing  Goal: Patient/Family Short Term Goal  Description: Patient's Short Term Goal: Safe & Free of Falls! Interventions:   - Fall risk precautions are followed to ensure safety.  - Non-skid socks are worn to prevent falls. - Bed and chair alarm remains in use at all times. - Call light within reach at all times. - See additional Care Plan goals for specific interventions  6/28/2022 2014 by Curry Thakur RN  Outcome: Progressing  6/28/2022 1457 by Curry Thakur RN  Outcome: Progressing     Patient is presently resting in the bed. Alert x 1. Vital signs taken and stable. Patient on heparin drip protocol per order. Activated PTT labs are monitored. Patient is turned and repositioned every 2 hours. Prompt care is given to continence. Kept clean and dry.  Alexa Guy in place to suction. Fall risk precautions are followed at all times. Daughter present at bedside. Patient Activated PTT was 221.2. Heparin drip was stopped at 6:38pm per protocol for 1 hour, and will be resumed at decreased to rate of 7 ml/hr. Activated PTT will be rechecked in 6 hours from restart of Heparin drip. Call light within reach at all times.

## 2022-06-29 NOTE — PROGRESS NOTES
[facility] Pharmacy Note:  Renal Dose Adjustment for Metoclopramide (REGLAN)    Amira Child has been prescribed Metoclopramide (REGLAN) 10 mg every 8 hours as needed for nausea/vomiting,. Estimated Creatinine Clearance: 36.4 mL/min (based on SCr of 0.98 mg/dL). Calculated creatinine clearance is < 40 ml/min, therefore, the dose of Metoclopramide (REGLAN) has been changed to 5 mg every 8 hours as needed for nausea/vomiting per P&T approved protocol. Pharmacy will continue to follow, and if renal function improves, will resume the original order.        Thank you,  Wanda Muir, PharmD  6/29/2022 12:55 PM

## 2022-06-29 NOTE — PLAN OF CARE
Problem: CARDIOVASCULAR - ADULT  Goal: Maintains optimal cardiac output and hemodynamic stability  Description: INTERVENTIONS:  - Monitor vital signs, rhythm, and trends  - Monitor for bleeding, hypotension and signs of decreased cardiac output  - Evaluate effectiveness of vasoactive medications to optimize hemodynamic stability  - Monitor arterial and/or venous puncture sites for bleeding and/or hematoma  - Assess quality of pulses, skin color and temperature  - Assess for signs of decreased coronary artery perfusion - ex.  Angina  - Evaluate fluid balance, assess for edema, trend weights  Outcome: Progressing     Problem: SKIN/TISSUE INTEGRITY - ADULT  Goal: Skin integrity remains intact  Description: INTERVENTIONS  - Assess and document risk factors for pressure ulcer development  - Assess and document skin integrity  - Monitor for areas of redness and/or skin breakdown  - Initiate interventions, skin care algorithm/standards of care as needed  Outcome: Progressing     Problem: HEMATOLOGIC - ADULT  Goal: Maintains hematologic stability  Description: INTERVENTIONS  - Assess for signs and symptoms of bleeding or hemorrhage  - Monitor labs and vital signs for trends  - Administer supportive blood products/factors, fluids and medications as ordered and appropriate  - Administer supportive blood products/factors as ordered and appropriate  Outcome: Progressing  Goal: Free from bleeding injury  Description: (Example usage: patient with low platelets)  INTERVENTIONS:  - Avoid intramuscular injections, enemas and rectal medication administration  - Ensure safe mobilization of patient  - Hold pressure on venipuncture sites to achieve adequate hemostasis  - Assess for signs and symptoms of internal bleeding  - Monitor lab trends  - Patient is to report abnormal signs of bleeding to staff  - Avoid use of toothpicks and dental floss  - Use electric shaver for shaving  - Use soft bristle tooth brush  - Limit straining and forceful nose blowing  Outcome: Progressing     Problem: PAIN - ADULT  Goal: Verbalizes/displays adequate comfort level or patient's stated pain goal  Description: INTERVENTIONS:  - Encourage pt to monitor pain and request assistance  - Assess pain using appropriate pain scale  - Administer analgesics based on type and severity of pain and evaluate response  - Implement non-pharmacological measures as appropriate and evaluate response  - Consider cultural and social influences on pain and pain management  - Manage/alleviate anxiety  - Utilize distraction and/or relaxation techniques  - Monitor for opioid side effects  - Notify MD/LIP if interventions unsuccessful or patient reports new pain  - Anticipate increased pain with activity and pre-medicate as appropriate  Outcome: Progressing   Remains on heparin drip.

## 2022-06-29 NOTE — PLAN OF CARE
Problem: Patient Centered Care  Goal: Patient preferences are identified and integrated in the patient's plan of care  Description: Interventions:  - What would you like us to know as we care for you?   - Provide timely, complete, and accurate information to patient/family  - Incorporate patient and family knowledge, values, beliefs, and cultural backgrounds into the planning and delivery of care  - Encourage patient/family to participate in care and decision-making at the level they choose  - Honor patient and family perspectives and choices  Outcome: Progressing     Problem: Patient/Family Goals  Goal: Patient/Family Long Term Goal  Description: Patient's Long Term Goal: Pain relief! Interventions:  - Patient pain level is monitor on scale from 1  to 10.  - Patient is medicated as needed for pain or discomfort. - Left lower extremity is elevated on pillow for safety and comfort to decrease swelling.  - See additional Care Plan goals for specific interventions  Outcome: Progressing  Goal: Patient/Family Short Term Goal  Description: Patient's Short Term Goal: Safe & Free from Falls! Interventions:   - Fall risk precautions are followed to ensure safety and comfort.  - Non-skid socks are worn to prevent falls. - Bed and chair alarm remains in use at all times. - See additional Care Plan goals for specific interventions  Outcome: Progressing     Problem: CARDIOVASCULAR - ADULT  Goal: Maintains optimal cardiac output and hemodynamic stability  Description: INTERVENTIONS:  - Monitor vital signs, rhythm, and trends  - Monitor for bleeding, hypotension and signs of decreased cardiac output  - Evaluate effectiveness of vasoactive medications to optimize hemodynamic stability  - Monitor arterial and/or venous puncture sites for bleeding and/or hematoma  - Assess quality of pulses, skin color and temperature  - Assess for signs of decreased coronary artery perfusion - ex.  Angina  - Evaluate fluid balance, assess for edema, trend weights  Outcome: Progressing     Problem: SKIN/TISSUE INTEGRITY - ADULT  Goal: Skin integrity remains intact  Description: INTERVENTIONS  - Assess and document risk factors for pressure ulcer development  - Assess and document skin integrity  - Monitor for areas of redness and/or skin breakdown  - Initiate interventions, skin care algorithm/standards of care as needed  Outcome: Progressing     Problem: HEMATOLOGIC - ADULT  Goal: Maintains hematologic stability  Description: INTERVENTIONS  - Assess for signs and symptoms of bleeding or hemorrhage  - Monitor labs and vital signs for trends  - Administer supportive blood products/factors, fluids and medications as ordered and appropriate  - Administer supportive blood products/factors as ordered and appropriate  Outcome: Progressing  Goal: Free from bleeding injury  Description: (Example usage: patient with low platelets)  INTERVENTIONS:  - Avoid intramuscular injections, enemas and rectal medication administration  - Ensure safe mobilization of patient  - Hold pressure on venipuncture sites to achieve adequate hemostasis  - Assess for signs and symptoms of internal bleeding  - Monitor lab trends  - Patient is to report abnormal signs of bleeding to staff  - Avoid use of toothpicks and dental floss  - Use electric shaver for shaving  - Use soft bristle tooth brush  - Limit straining and forceful nose blowing  Outcome: Progressing     Problem: PAIN - ADULT  Goal: Verbalizes/displays adequate comfort level or patient's stated pain goal  Description: INTERVENTIONS:  - Encourage pt to monitor pain and request assistance  - Assess pain using appropriate pain scale  - Administer analgesics based on type and severity of pain and evaluate response  - Implement non-pharmacological measures as appropriate and evaluate response  - Consider cultural and social influences on pain and pain management  - Manage/alleviate anxiety  - Utilize distraction and/or relaxation techniques  - Monitor for opioid side effects  - Notify MD/LIP if interventions unsuccessful or patient reports new pain  - Anticipate increased pain with activity and pre-medicate as appropriate  Outcome: Progressing     Problem: SAFETY ADULT - FALL  Goal: Free from fall injury  Description: INTERVENTIONS:  - Assess pt frequently for physical needs  - Identify cognitive and physical deficits and behaviors that affect risk of falls. - [place] fall precautions as indicated by assessment.  - Educate pt/family on patient safety including physical limitations  - Instruct pt to call for assistance with activity based on assessment  - Modify environment to reduce risk of injury  - Provide assistive devices as appropriate  - Consider OT/PT consult to assist with strengthening/mobility  - Encourage toileting schedule  Outcome: Progressing    Patient is presently sitting up in recliner chair using overhead lift equipment. Vital signs and labs are monitored. Patient currently receiving Heparin drip that is infusing at 6 ml/hr per heparin drip protocol. APTT this morning was 90.0. APTT lab results required no change in rate and to APTT will be check in am per protocol. Fall risk precautions are followed to ensure safety and comfort. Bilateral lower extremities are elevated on pillow for safety and to help decrease swelling in left lower extremity. Patient is turned and repositioned every 2 hours. Prompt care is given to continence. Grady Julio in place to suction and draining urine. Family educated on heparin drip and protocol.

## 2022-06-29 NOTE — CM/SW NOTE
Department  notified of request for [facility], [facility] referrals started. Assigned CM/SW to follow up with pt/family on further discharge planning.      Susan Nieto   June 29, 2022   10:02

## 2022-06-29 NOTE — SPIRITUAL CARE NOTE
Pt was sleeping with head upright in bed in a partially lit room.  left SCT card at bedside. Pt is being treated for DVT.  will revisit.

## 2022-06-30 ENCOUNTER — APPOINTMENT (OUTPATIENT)
Dept: CV DIAGNOSTICS | Facility: HOSPITAL | Age: 87
End: 2022-06-30
Attending: NURSE PRACTITIONER
Payer: MEDICARE

## 2022-06-30 ENCOUNTER — APPOINTMENT (OUTPATIENT)
Dept: CT IMAGING | Facility: HOSPITAL | Age: 87
End: 2022-06-30
Attending: NURSE PRACTITIONER
Payer: MEDICARE

## 2022-06-30 LAB
ANION GAP SERPL CALC-SCNC: 3 MMOL/L (ref 0–18)
APTT PPP: 51.7 SECONDS (ref 23.3–35.6)
BUN BLD-MCNC: 11 MG/DL (ref 7–18)
BUN/CREAT SERPL: 12 (ref 10–20)
CALCIUM BLD-MCNC: 8.2 MG/DL (ref 8.5–10.1)
CHLORIDE SERPL-SCNC: 108 MMOL/L (ref 98–112)
CO2 SERPL-SCNC: 29 MMOL/L (ref 21–32)
CREAT BLD-MCNC: 0.92 MG/DL
D DIMER PPP FEU-MCNC: 6.1 UG/ML FEU (ref ?–0.87)
GLUCOSE BLD-MCNC: 83 MG/DL (ref 70–99)
OSMOLALITY SERPL CALC.SUM OF ELEC: 289 MOSM/KG (ref 275–295)
PLATELET # BLD AUTO: 239 10(3)UL (ref 150–450)
POTASSIUM SERPL-SCNC: 4 MMOL/L (ref 3.5–5.1)
SODIUM SERPL-SCNC: 140 MMOL/L (ref 136–145)
TROPONIN I HIGH SENSITIVITY: 7 NG/L

## 2022-06-30 PROCEDURE — 93306 TTE W/DOPPLER COMPLETE: CPT | Performed by: NURSE PRACTITIONER

## 2022-06-30 PROCEDURE — 99223 1ST HOSP IP/OBS HIGH 75: CPT | Performed by: INTERNAL MEDICINE

## 2022-06-30 PROCEDURE — 71260 CT THORAX DX C+: CPT | Performed by: NURSE PRACTITIONER

## 2022-06-30 PROCEDURE — 99233 SBSQ HOSP IP/OBS HIGH 50: CPT | Performed by: HOSPITALIST

## 2022-06-30 RX ORDER — IPRATROPIUM BROMIDE AND ALBUTEROL SULFATE 2.5; .5 MG/3ML; MG/3ML
3 SOLUTION RESPIRATORY (INHALATION)
Status: DISCONTINUED | OUTPATIENT
Start: 2022-06-30 | End: 2022-06-30

## 2022-06-30 RX ORDER — IPRATROPIUM BROMIDE AND ALBUTEROL SULFATE 2.5; .5 MG/3ML; MG/3ML
3 SOLUTION RESPIRATORY (INHALATION)
Status: DISCONTINUED | OUTPATIENT
Start: 2022-06-30 | End: 2022-07-01

## 2022-06-30 RX ORDER — FLUTICASONE PROPIONATE 50 MCG
1 SPRAY, SUSPENSION (ML) NASAL 2 TIMES DAILY
Qty: 1 EACH | Refills: 0 | Status: SHIPPED | OUTPATIENT
Start: 2022-06-30

## 2022-06-30 RX ORDER — FLUTICASONE PROPIONATE 50 MCG
1 SPRAY, SUSPENSION (ML) NASAL 2 TIMES DAILY
Status: DISCONTINUED | OUTPATIENT
Start: 2022-06-30 | End: 2022-07-01

## 2022-06-30 RX ORDER — CETIRIZINE HYDROCHLORIDE 5 MG/1
5 TABLET ORAL DAILY
Refills: 0 | Status: SHIPPED | COMMUNITY
Start: 2022-06-30

## 2022-06-30 RX ORDER — CETIRIZINE HYDROCHLORIDE 5 MG/1
5 TABLET ORAL DAILY
Status: DISCONTINUED | OUTPATIENT
Start: 2022-06-30 | End: 2022-07-01

## 2022-06-30 NOTE — PHYSICAL THERAPY NOTE
PT order received however pt is pending cardiothoracic consult for a thoracic aneurysm  Pt also has acute DVT 6/28/22 and PE 6/30/22  PT to f/u when medically cleared for activity

## 2022-06-30 NOTE — PLAN OF CARE
Problem: CARDIOVASCULAR - ADULT  Goal: Maintains optimal cardiac output and hemodynamic stability  Description: INTERVENTIONS:  - Monitor vital signs, rhythm, and trends  - Monitor for bleeding, hypotension and signs of decreased cardiac output  - Evaluate effectiveness of vasoactive medications to optimize hemodynamic stability  - Monitor arterial and/or venous puncture sites for bleeding and/or hematoma  - Assess quality of pulses, skin color and temperature  - Assess for signs of decreased coronary artery perfusion - ex.  Angina  - Evaluate fluid balance, assess for edema, trend weights  Outcome: Progressing     Problem: SKIN/TISSUE INTEGRITY - ADULT  Goal: Skin integrity remains intact  Description: INTERVENTIONS  - Assess and document risk factors for pressure ulcer development  - Assess and document skin integrity  - Monitor for areas of redness and/or skin breakdown  - Initiate interventions, skin care algorithm/standards of care as needed  Outcome: Progressing     Problem: PAIN - ADULT  Goal: Verbalizes/displays adequate comfort level or patient's stated pain goal  Description: INTERVENTIONS:  - Encourage pt to monitor pain and request assistance  - Assess pain using appropriate pain scale  - Administer analgesics based on type and severity of pain and evaluate response  - Implement non-pharmacological measures as appropriate and evaluate response  - Consider cultural and social influences on pain and pain management  - Manage/alleviate anxiety  - Utilize distraction and/or relaxation techniques  - Monitor for opioid side effects  - Notify MD/LIP if interventions unsuccessful or patient reports new pain  - Anticipate increased pain with activity and pre-medicate as appropriate  Outcome: Progressing

## 2022-06-30 NOTE — PLAN OF CARE
Problem: Patient Centered Care  Goal: Patient preferences are identified and integrated in the patient's plan of care  Description: Interventions:  - What would you like us to know as we care for you?   - Provide timely, complete, and accurate information to patient/family  - Incorporate patient and family knowledge, values, beliefs, and cultural backgrounds into the planning and delivery of care  - Encourage patient/family to participate in care and decision-making at the level they choose  - Honor patient and family perspectives and choices  Outcome: Progressing     Problem: Patient/Family Goals  Goal: Patient/Family Long Term Goal  Description: Patient's Long Term Goal: LUIS    Interventions:  - LUIS  - See additional Care Plan goals for specific interventions  Outcome: Progressing  Goal: Patient/Family Short Term Goal  Description: Patient's Short Term Goal: LUIS    Interventions:   - LUIS  - See additional Care Plan goals for specific interventions  Outcome: Progressing     Problem: CARDIOVASCULAR - ADULT  Goal: Maintains optimal cardiac output and hemodynamic stability  Description: INTERVENTIONS:  - Monitor vital signs, rhythm, and trends  - Monitor for bleeding, hypotension and signs of decreased cardiac output  - Evaluate effectiveness of vasoactive medications to optimize hemodynamic stability  - Monitor arterial and/or venous puncture sites for bleeding and/or hematoma  - Assess quality of pulses, skin color and temperature  - Assess for signs of decreased coronary artery perfusion - ex.  Angina  - Evaluate fluid balance, assess for edema, trend weights  Outcome: Progressing     Problem: SKIN/TISSUE INTEGRITY - ADULT  Goal: Skin integrity remains intact  Description: INTERVENTIONS  - Assess and document risk factors for pressure ulcer development  - Assess and document skin integrity  - Monitor for areas of redness and/or skin breakdown  - Initiate interventions, skin care algorithm/standards of care as needed  Outcome: Progressing     Problem: HEMATOLOGIC - ADULT  Goal: Maintains hematologic stability  Description: INTERVENTIONS  - Assess for signs and symptoms of bleeding or hemorrhage  - Monitor labs and vital signs for trends  - Administer supportive blood products/factors, fluids and medications as ordered and appropriate  - Administer supportive blood products/factors as ordered and appropriate  Outcome: Progressing  Goal: Free from bleeding injury  Description: (Example usage: patient with low platelets)  INTERVENTIONS:  - Avoid intramuscular injections, enemas and rectal medication administration  - Ensure safe mobilization of patient  - Hold pressure on venipuncture sites to achieve adequate hemostasis  - Assess for signs and symptoms of internal bleeding  - Monitor lab trends  - Patient is to report abnormal signs of bleeding to staff  - Avoid use of toothpicks and dental floss  - Use electric shaver for shaving  - Use soft bristle tooth brush  - Limit straining and forceful nose blowing  Outcome: Progressing     Problem: PAIN - ADULT  Goal: Verbalizes/displays adequate comfort level or patient's stated pain goal  Description: INTERVENTIONS:  - Encourage pt to monitor pain and request assistance  - Assess pain using appropriate pain scale  - Administer analgesics based on type and severity of pain and evaluate response  - Implement non-pharmacological measures as appropriate and evaluate response  - Consider cultural and social influences on pain and pain management  - Manage/alleviate anxiety  - Utilize distraction and/or relaxation techniques  - Monitor for opioid side effects  - Notify MD/LIP if interventions unsuccessful or patient reports new pain  - Anticipate increased pain with activity and pre-medicate as appropriate  Outcome: Progressing     Problem: SAFETY ADULT - FALL  Goal: Free from fall injury  Description: INTERVENTIONS:  - Assess pt frequently for physical needs  - Identify cognitive and physical deficits and behaviors that affect risk of falls. - [place] fall precautions as indicated by assessment.  - Educate pt/family on patient safety including physical limitations  - Instruct pt to call for assistance with activity based on assessment  - Modify environment to reduce risk of injury  - Provide assistive devices as appropriate  - Consider OT/PT consult to assist with strengthening/mobility  - Encourage toileting schedule  Outcome: Progressing     Call light within reach, bed in lowest position, alarms on, and nonskid socks on. Dtr at bedside.

## 2022-06-30 NOTE — CM/SW NOTE
APN order to check cost for Lovenox inj. q12    CM called [facility] and cost is $3.95/syringe    CM notified MD/RN of above. Per RN pt was able to swallow the Xarelto today with applesuce and supv. / to remain available for support and/or discharge planning.      Tiffany Pleitez, RN    Ext [phone]

## 2022-07-01 VITALS
HEIGHT: 65 IN | SYSTOLIC BLOOD PRESSURE: 132 MMHG | DIASTOLIC BLOOD PRESSURE: 46 MMHG | TEMPERATURE: 98 F | OXYGEN SATURATION: 98 % | RESPIRATION RATE: 18 BRPM | WEIGHT: 137.13 LBS | HEART RATE: 75 BPM | BODY MASS INDEX: 22.85 KG/M2

## 2022-07-01 LAB
ANION GAP SERPL CALC-SCNC: 7 MMOL/L (ref 0–18)
BUN BLD-MCNC: 13 MG/DL (ref 7–18)
BUN/CREAT SERPL: 13 (ref 10–20)
CALCIUM BLD-MCNC: 8.2 MG/DL (ref 8.5–10.1)
CHLORIDE SERPL-SCNC: 111 MMOL/L (ref 98–112)
CO2 SERPL-SCNC: 27 MMOL/L (ref 21–32)
CREAT BLD-MCNC: 1 MG/DL
DEPRECATED RDW RBC AUTO: 45.3 FL (ref 35.1–46.3)
ERYTHROCYTE [DISTWIDTH] IN BLOOD BY AUTOMATED COUNT: 13.2 % (ref 11–15)
GLUCOSE BLD-MCNC: 88 MG/DL (ref 70–99)
HCT VFR BLD AUTO: 37.5 %
HGB BLD-MCNC: 11 G/DL
MCH RBC QN AUTO: 27.2 PG (ref 26–34)
MCHC RBC AUTO-ENTMCNC: 29.3 G/DL (ref 31–37)
MCV RBC AUTO: 92.6 FL
OSMOLALITY SERPL CALC.SUM OF ELEC: 300 MOSM/KG (ref 275–295)
PLATELET # BLD AUTO: 278 10(3)UL (ref 150–450)
PLATELET # BLD AUTO: 278 10(3)UL (ref 150–450)
POTASSIUM SERPL-SCNC: 4.1 MMOL/L (ref 3.5–5.1)
RBC # BLD AUTO: 4.05 X10(6)UL
SODIUM SERPL-SCNC: 145 MMOL/L (ref 136–145)
WBC # BLD AUTO: 6.6 X10(3) UL (ref 4–11)

## 2022-07-01 PROCEDURE — 99239 HOSP IP/OBS DSCHRG MGMT >30: CPT | Performed by: HOSPITALIST

## 2022-07-01 PROCEDURE — 99232 SBSQ HOSP IP/OBS MODERATE 35: CPT | Performed by: INTERNAL MEDICINE

## 2022-07-01 NOTE — OCCUPATIONAL THERAPY NOTE
Orders received, chart reviewed for occupational therapy evaluation. Pt with ascending thoracic aortic aneurysm as seen on chest CT --plan for cardiothoracic surgery consult per EMR. Will follow-up pending further medical plan, when medically cleared for activity.

## 2022-07-01 NOTE — PLAN OF CARE
Problem: Patient Centered Care  Goal: Patient preferences are identified and integrated in the patient's plan of care  Description: Interventions:  - What would you like us to know as we care for you? \"I am from home with my daughter. \"  - Provide timely, complete, and accurate information to patient/family  - Incorporate patient and family knowledge, values, beliefs, and cultural backgrounds into the planning and delivery of care  - Encourage patient/family to participate in care and decision-making at the level they choose  - Honor patient and family perspectives and choices  Outcome: Completed     Problem: Patient/Family Goals  Goal: Patient/Family Long Term Goal  Description: Patient's Long Term Goal: \"to go home\"    Interventions:  -Follow up MD appt  -Take meds as prescribed  -Use of assistive device if needed  - See additional Care Plan goals for specific interventions  Outcome: Completed  Goal: Patient/Family Short Term Goal  Description: Patient's Short Term Goal: \"to prevent swelling and pain\"    Interventions:   -Monitor VS  -Check labs results  -Provide pain meds as prescribed  - See additional Care Plan goals for specific interventions  Outcome: Completed     Problem: CARDIOVASCULAR - ADULT  Goal: Maintains optimal cardiac output and hemodynamic stability  Description: INTERVENTIONS:  - Monitor vital signs, rhythm, and trends  - Monitor for bleeding, hypotension and signs of decreased cardiac output  - Evaluate effectiveness of vasoactive medications to optimize hemodynamic stability  - Monitor arterial and/or venous puncture sites for bleeding and/or hematoma  - Assess quality of pulses, skin color and temperature  - Assess for signs of decreased coronary artery perfusion - ex.  Angina  - Evaluate fluid balance, assess for edema, trend weights  Outcome: Completed     Problem: SKIN/TISSUE INTEGRITY - ADULT  Goal: Skin integrity remains intact  Description: INTERVENTIONS  - Assess and document risk factors for pressure ulcer development  - Assess and document skin integrity  - Monitor for areas of redness and/or skin breakdown  - Initiate interventions, skin care algorithm/standards of care as needed  Outcome: Completed     Problem: HEMATOLOGIC - ADULT  Goal: Maintains hematologic stability  Description: INTERVENTIONS  - Assess for signs and symptoms of bleeding or hemorrhage  - Monitor labs and vital signs for trends  - Administer supportive blood products/factors, fluids and medications as ordered and appropriate  - Administer supportive blood products/factors as ordered and appropriate  Outcome: Completed  Goal: Free from bleeding injury  Description: (Example usage: patient with low platelets)  INTERVENTIONS:  - Avoid intramuscular injections, enemas and rectal medication administration  - Ensure safe mobilization of patient  - Hold pressure on venipuncture sites to achieve adequate hemostasis  - Assess for signs and symptoms of internal bleeding  - Monitor lab trends  - Patient is to report abnormal signs of bleeding to staff  - Avoid use of toothpicks and dental floss  - Use electric shaver for shaving  - Use soft bristle tooth brush  - Limit straining and forceful nose blowing  Outcome: Completed     Problem: PAIN - ADULT  Goal: Verbalizes/displays adequate comfort level or patient's stated pain goal  Description: INTERVENTIONS:  - Encourage pt to monitor pain and request assistance  - Assess pain using appropriate pain scale  - Administer analgesics based on type and severity of pain and evaluate response  - Implement non-pharmacological measures as appropriate and evaluate response  - Consider cultural and social influences on pain and pain management  - Manage/alleviate anxiety  - Utilize distraction and/or relaxation techniques  - Monitor for opioid side effects  - Notify MD/LIP if interventions unsuccessful or patient reports new pain  - Anticipate increased pain with activity and pre-medicate as appropriate  Outcome: Completed     Problem: SAFETY ADULT - FALL  Goal: Free from fall injury  Description: INTERVENTIONS:  - Assess pt frequently for physical needs  - Identify cognitive and physical deficits and behaviors that affect risk of falls. - [place] fall precautions as indicated by assessment.  - Educate pt/family on patient safety including physical limitations  - Instruct pt to call for assistance with activity based on assessment  - Modify environment to reduce risk of injury  - Provide assistive devices as appropriate  - Consider OT/PT consult to assist with strengthening/mobility  - Encourage toileting schedule  Outcome: Completed       Remote tele removed, IV removed, paperwork discussed with daughter at bedside, no further questions at this time.  Patient and daughter awaiting ambulance for transfer home

## 2022-07-01 NOTE — PLAN OF CARE
Problem: Patient Centered Care  Goal: Patient preferences are identified and integrated in the patient's plan of care  Description: Interventions:  - What would you like us to know as we care for you? \"I am from home with my daughter. \"  - Provide timely, complete, and accurate information to patient/family  - Incorporate patient and family knowledge, values, beliefs, and cultural backgrounds into the planning and delivery of care  - Encourage patient/family to participate in care and decision-making at the level they choose  - Honor patient and family perspectives and choices  7/1/2022 0337 by Emmanuelle Buchanan RN  Outcome: Progressing  7/1/2022 0258 by Emmanuelle Buchanan RN  Outcome: Progressing     Problem: Patient/Family Goals  Goal: Patient/Family Long Term Goal  Description: Patient's Long Term Goal: \"to go home\"    Interventions:  -Follow up MD appt  -Take meds as prescribed  -Use of assistive device if needed  - See additional Care Plan goals for specific interventions  7/1/2022 0337 by Emmanuelle Buchanan RN  Outcome: Progressing  7/1/2022 0258 by Emmanuelle Buchanan RN  Outcome: Progressing  Goal: Patient/Family Short Term Goal  Description: Patient's Short Term Goal: \"to prevent swelling and pain\"    Interventions:   -Monitor VS  -Check labs results  -Provide pain meds as prescribed  - See additional Care Plan goals for specific interventions  7/1/2022 0337 by Emmanuelle Buchanan RN  Outcome: Progressing  7/1/2022 0258 by Emmanuelle Buchanan RN  Outcome: Progressing     Problem: CARDIOVASCULAR - ADULT  Goal: Maintains optimal cardiac output and hemodynamic stability  Description: INTERVENTIONS:  - Monitor vital signs, rhythm, and trends  - Monitor for bleeding, hypotension and signs of decreased cardiac output  - Evaluate effectiveness of vasoactive medications to optimize hemodynamic stability  - Monitor arterial and/or venous puncture sites for bleeding and/or hematoma  - Assess quality of pulses, skin color and temperature  - Assess for signs of decreased coronary artery perfusion - ex.  Angina  - Evaluate fluid balance, assess for edema, trend weights  7/1/2022 0337 by Cinthya Arguello RN  Outcome: Progressing  7/1/2022 0258 by Cinthya Arguello RN  Outcome: Progressing     Problem: SKIN/TISSUE INTEGRITY - ADULT  Goal: Skin integrity remains intact  Description: INTERVENTIONS  - Assess and document risk factors for pressure ulcer development  - Assess and document skin integrity  - Monitor for areas of redness and/or skin breakdown  - Initiate interventions, skin care algorithm/standards of care as needed  7/1/2022 0337 by Cinthya Arguello RN  Outcome: Progressing  7/1/2022 0258 by Cinthya Arguello RN  Outcome: Progressing     Problem: HEMATOLOGIC - ADULT  Goal: Maintains hematologic stability  Description: INTERVENTIONS  - Assess for signs and symptoms of bleeding or hemorrhage  - Monitor labs and vital signs for trends  - Administer supportive blood products/factors, fluids and medications as ordered and appropriate  - Administer supportive blood products/factors as ordered and appropriate  7/1/2022 0337 by Cinthya Arguello RN  Outcome: Progressing  7/1/2022 0258 by Cinthya Arguello RN  Outcome: Progressing  Goal: Free from bleeding injury  Description: (Example usage: patient with low platelets)  INTERVENTIONS:  - Avoid intramuscular injections, enemas and rectal medication administration  - Ensure safe mobilization of patient  - Hold pressure on venipuncture sites to achieve adequate hemostasis  - Assess for signs and symptoms of internal bleeding  - Monitor lab trends  - Patient is to report abnormal signs of bleeding to staff  - Avoid use of toothpicks and dental floss  - Use electric shaver for shaving  - Use soft bristle tooth brush  - Limit straining and forceful nose blowing  7/1/2022 0337 by Cinthya Arguello RN  Outcome: Progressing  7/1/2022 0258 by Cinthya Arguello RN  Outcome: Progressing     Problem: PAIN - ADULT  Goal: Verbalizes/displays adequate comfort level or patient's stated pain goal  Description: INTERVENTIONS:  - Encourage pt to monitor pain and request assistance  - Assess pain using appropriate pain scale  - Administer analgesics based on type and severity of pain and evaluate response  - Implement non-pharmacological measures as appropriate and evaluate response  - Consider cultural and social influences on pain and pain management  - Manage/alleviate anxiety  - Utilize distraction and/or relaxation techniques  - Monitor for opioid side effects  - Notify MD/LIP if interventions unsuccessful or patient reports new pain  - Anticipate increased pain with activity and pre-medicate as appropriate  7/1/2022 0337 by Mirta Jones RN  Outcome: Progressing  7/1/2022 0258 by Mirta Jones RN  Outcome: Progressing     Problem: SAFETY ADULT - FALL  Goal: Free from fall injury  Description: INTERVENTIONS:  - Assess pt frequently for physical needs  - Identify cognitive and physical deficits and behaviors that affect risk of falls. - [place] fall precautions as indicated by assessment.  - Educate pt/family on patient safety including physical limitations  - Instruct pt to call for assistance with activity based on assessment  - Modify environment to reduce risk of injury  - Provide assistive devices as appropriate  - Consider OT/PT consult to assist with strengthening/mobility  - Encourage toileting schedule  7/1/2022 0337 by Mirta Jnoes RN  Outcome: Progressing  7/1/2022 0258 by Mirta Jones RN  Outcome: Progressing     Patient is alert and oriented x 2-3, forgetful at times. She denies to be in any pain or discomfort at this time. No acute changes at this time. Safety and fall precautions maintained, bed alarm on. Call light within reach. Frequent rounding by nursing staff.

## 2022-07-05 NOTE — PAYOR COMM NOTE
Discharge Notification    Patient Name: Aaron Cope: [facility]  Subscriber #: [id number]  Authorization Number: [id number]  Admit Date/Time: 6/28/2022 8:31 AM  Discharge Date/Time: 7/1/2022 6:50 PM

## 2022-07-07 ENCOUNTER — TELEPHONE (OUTPATIENT)
Dept: PULMONOLOGY | Facility: CLINIC | Age: 87
End: 2022-07-07

## 2022-07-08 NOTE — TELEPHONE ENCOUNTER
Dr. Mino Villareal - Patient discharged 7/1/22. When do you want to see patient in hospital follow up?

## 2022-07-08 NOTE — TELEPHONE ENCOUNTER
Spoke with patient's daughter, Mode Martin, [facility] [place] follow up appointment scheduled with Dr. Kp Garcia on 7/14/22 at 3:10pm.  Verified date, time, location & parking, daughter verbalized understanding.

## 2022-07-14 ENCOUNTER — TELEPHONE (OUTPATIENT)
Dept: PULMONOLOGY | Facility: CLINIC | Age: 87
End: 2022-07-14

## 2022-07-14 NOTE — TELEPHONE ENCOUNTER
Pt arrived 20 min late for appt today with Dr Ludin Velasquez- MD was gone to hospital already-     Pl call daughter Irasema Browne to schedule for hosp fu

## 2022-07-14 NOTE — TELEPHONE ENCOUNTER
Dr. Mary Rasheed missed appointment today-was 20 minutes late. Requesting to be added to your schedule.

## 2022-07-22 ENCOUNTER — APPOINTMENT (OUTPATIENT)
Dept: CT IMAGING | Facility: HOSPITAL | Age: 87
End: 2022-07-22
Attending: EMERGENCY MEDICINE
Payer: MEDICARE

## 2022-07-22 ENCOUNTER — HOSPITAL ENCOUNTER (INPATIENT)
Facility: HOSPITAL | Age: 87
LOS: 3 days | Discharge: HOME HEALTH | End: 2022-07-25
Attending: EMERGENCY MEDICINE | Admitting: HOSPITALIST
Payer: MEDICARE

## 2022-07-22 ENCOUNTER — HOSPITAL ENCOUNTER (OUTPATIENT)
Facility: HOSPITAL | Age: 87
Setting detail: OBSERVATION
Discharge: HOME HEALTH | End: 2022-07-25
Attending: EMERGENCY MEDICINE | Admitting: HOSPITALIST
Payer: MEDICARE

## 2022-07-22 DIAGNOSIS — Z79.01 ANTICOAGULATED: ICD-10-CM

## 2022-07-22 DIAGNOSIS — K62.5 RECTAL BLEEDING: Primary | ICD-10-CM

## 2022-07-22 DIAGNOSIS — K57.90 DIVERTICULOSIS: ICD-10-CM

## 2022-07-22 DIAGNOSIS — I71.4 ABDOMINAL AORTIC ANEURYSM (AAA) WITHOUT RUPTURE (HCC): ICD-10-CM

## 2022-07-22 LAB
ADENOVIRUS F 40/41 PCR: NEGATIVE
ANION GAP SERPL CALC-SCNC: 6 MMOL/L (ref 0–18)
ANTIBODY SCREEN: NEGATIVE
APTT PPP: 65 SECONDS (ref 23.3–35.6)
ASTROVIRUS PCR: NEGATIVE
BASOPHILS # BLD AUTO: 0.04 X10(3) UL (ref 0–0.2)
BASOPHILS NFR BLD AUTO: 0.5 %
BUN BLD-MCNC: 14 MG/DL (ref 7–18)
BUN/CREAT SERPL: 14.9 (ref 10–20)
C CAYETANENSIS DNA SPEC QL NAA+PROBE: NEGATIVE
CALCIUM BLD-MCNC: 8.4 MG/DL (ref 8.5–10.1)
CAMPY SP DNA.DIARRHEA STL QL NAA+PROBE: NEGATIVE
CHLORIDE SERPL-SCNC: 110 MMOL/L (ref 98–112)
CO2 SERPL-SCNC: 28 MMOL/L (ref 21–32)
CREAT BLD-MCNC: 0.94 MG/DL
CRYPTOSP DNA SPEC QL NAA+PROBE: NEGATIVE
DEPRECATED RDW RBC AUTO: 44.9 FL (ref 35.1–46.3)
DEPRECATED RDW RBC AUTO: 45.8 FL (ref 35.1–46.3)
EAEC PAA PLAS AGGR+AATA ST NAA+NON-PRB: NEGATIVE
EC STX1+STX2 + H7 FLIC SPEC NAA+PROBE: NEGATIVE
ENTAMOEBA HISTOLYTICA PCR: NEGATIVE
EOSINOPHIL # BLD AUTO: 0.13 X10(3) UL (ref 0–0.7)
EOSINOPHIL NFR BLD AUTO: 1.7 %
EPEC EAE GENE STL QL NAA+NON-PROBE: NEGATIVE
ERYTHROCYTE [DISTWIDTH] IN BLOOD BY AUTOMATED COUNT: 13.3 % (ref 11–15)
ERYTHROCYTE [DISTWIDTH] IN BLOOD BY AUTOMATED COUNT: 13.4 % (ref 11–15)
ETEC LTA+ST1A+ST1B TOX ST NAA+NON-PROBE: NEGATIVE
GIARDIA LAMBLIA PCR: NEGATIVE
GLUCOSE BLD-MCNC: 92 MG/DL (ref 70–99)
HCT VFR BLD AUTO: 30.1 %
HCT VFR BLD AUTO: 36.5 %
HGB BLD-MCNC: 10.8 G/DL
HGB BLD-MCNC: 8.9 G/DL
IMM GRANULOCYTES # BLD AUTO: 0.03 X10(3) UL (ref 0–1)
IMM GRANULOCYTES NFR BLD: 0.4 %
INR BLD: 2.38 (ref 0.8–1.2)
LACTATE SERPL-SCNC: 1.7 MMOL/L (ref 0.4–2)
LYMPHOCYTES # BLD AUTO: 1.48 X10(3) UL (ref 1–4)
LYMPHOCYTES NFR BLD AUTO: 19.8 %
MCH RBC QN AUTO: 27.1 PG (ref 26–34)
MCH RBC QN AUTO: 27.4 PG (ref 26–34)
MCHC RBC AUTO-ENTMCNC: 29.6 G/DL (ref 31–37)
MCHC RBC AUTO-ENTMCNC: 29.6 G/DL (ref 31–37)
MCV RBC AUTO: 91.8 FL
MCV RBC AUTO: 92.6 FL
MONOCYTES # BLD AUTO: 0.59 X10(3) UL (ref 0.1–1)
MONOCYTES NFR BLD AUTO: 7.9 %
NEUTROPHILS # BLD AUTO: 5.21 X10 (3) UL (ref 1.5–7.7)
NEUTROPHILS # BLD AUTO: 5.21 X10(3) UL (ref 1.5–7.7)
NEUTROPHILS NFR BLD AUTO: 69.7 %
NOROVIRUS GI/GII PCR: NEGATIVE
OSMOLALITY SERPL CALC.SUM OF ELEC: 298 MOSM/KG (ref 275–295)
P SHIGELLOIDES DNA STL QL NAA+PROBE: NEGATIVE
PLATELET # BLD AUTO: 179 10(3)UL (ref 150–450)
PLATELET # BLD AUTO: 235 10(3)UL (ref 150–450)
POTASSIUM SERPL-SCNC: 3.9 MMOL/L (ref 3.5–5.1)
PROTHROMBIN TIME: 26.3 SECONDS (ref 11.6–14.8)
RBC # BLD AUTO: 3.28 X10(6)UL
RBC # BLD AUTO: 3.94 X10(6)UL
RH BLOOD TYPE: POSITIVE
ROTAVIRUS A PCR: NEGATIVE
SALMONELLA DNA SPEC QL NAA+PROBE: NEGATIVE
SAPOVIRUS PCR: NEGATIVE
SARS-COV-2 RNA RESP QL NAA+PROBE: NOT DETECTED
SHIGELLA SP+EIEC IPAH ST NAA+NON-PROBE: NEGATIVE
SODIUM SERPL-SCNC: 144 MMOL/L (ref 136–145)
V CHOLERAE DNA SPEC QL NAA+PROBE: NEGATIVE
VIBRIO DNA SPEC NAA+PROBE: NEGATIVE
WBC # BLD AUTO: 6.3 X10(3) UL (ref 4–11)
WBC # BLD AUTO: 7.5 X10(3) UL (ref 4–11)
YERSINIA DNA SPEC NAA+PROBE: NEGATIVE

## 2022-07-22 PROCEDURE — 74177 CT ABD & PELVIS W/CONTRAST: CPT | Performed by: EMERGENCY MEDICINE

## 2022-07-22 PROCEDURE — 99223 1ST HOSP IP/OBS HIGH 75: CPT | Performed by: HOSPITALIST

## 2022-07-22 PROCEDURE — 99223 1ST HOSP IP/OBS HIGH 75: CPT | Performed by: INTERNAL MEDICINE

## 2022-07-22 RX ORDER — SODIUM CHLORIDE 9 MG/ML
INJECTION, SOLUTION INTRAVENOUS CONTINUOUS
Status: DISCONTINUED | OUTPATIENT
Start: 2022-07-22 | End: 2022-07-25

## 2022-07-22 RX ORDER — ONDANSETRON 2 MG/ML
4 INJECTION INTRAMUSCULAR; INTRAVENOUS EVERY 6 HOURS PRN
Status: DISCONTINUED | OUTPATIENT
Start: 2022-07-22 | End: 2022-07-25

## 2022-07-22 RX ORDER — LATANOPROST 50 UG/ML
1 SOLUTION/ DROPS OPHTHALMIC NIGHTLY
Status: DISCONTINUED | OUTPATIENT
Start: 2022-07-22 | End: 2022-07-25

## 2022-07-22 RX ORDER — ALBUTEROL SULFATE 90 UG/1
1 AEROSOL, METERED RESPIRATORY (INHALATION)
Status: DISCONTINUED | OUTPATIENT
Start: 2022-07-22 | End: 2022-07-25

## 2022-07-22 RX ORDER — ACETAMINOPHEN 325 MG/1
650 TABLET ORAL EVERY 6 HOURS PRN
Status: DISCONTINUED | OUTPATIENT
Start: 2022-07-22 | End: 2022-07-25

## 2022-07-22 RX ORDER — DORZOLAMIDE HYDROCHLORIDE AND TIMOLOL MALEATE 20; 5 MG/ML; MG/ML
1 SOLUTION/ DROPS OPHTHALMIC 2 TIMES DAILY
Status: DISCONTINUED | OUTPATIENT
Start: 2022-07-22 | End: 2022-07-25

## 2022-07-22 RX ORDER — FLUTICASONE PROPIONATE 50 MCG
1 SPRAY, SUSPENSION (ML) NASAL 2 TIMES DAILY
Status: DISCONTINUED | OUTPATIENT
Start: 2022-07-22 | End: 2022-07-25

## 2022-07-22 RX ORDER — METOCLOPRAMIDE HYDROCHLORIDE 5 MG/ML
5 INJECTION INTRAMUSCULAR; INTRAVENOUS EVERY 8 HOURS PRN
Status: DISCONTINUED | OUTPATIENT
Start: 2022-07-22 | End: 2022-07-25

## 2022-07-22 NOTE — PROGRESS NOTES
Report given to [facility]. Pt to be transferred to room 558. All belongings and medications packed and taken up with patient. Family at bedside and updated on plan of care.      Brittany Lizarraga RN

## 2022-07-22 NOTE — CM/SW NOTE
07/22/22 1100   CM/MYKE Referral Data   Referral Source Physician   Reason for Referral   [facility] Evaluation)   Informant Daughter  Judy January, son Jazmyne Beaulieu also present)   Readmission Assessment   Factors that patient feels contributed to this readmission Acute/Chronic Clinical Presentation   Pt's living situation prior to admission? Home with family   Pt's level of independence at discharge? Total assist (max)   Was full assessment completed by SW/CM on prior admission? Yes   Was the recommended discharge plan achieved? Yes   Was pt. discharged w/out services? No   Pertinent Medical Hx   Does patient have an established PCP? Yes  Althea Sandra)   Patient Info   Patient lives with Daughter   Patient Status Prior to Admission   Independent with ADLs and Mobility No   Pt. requires assistance with Housework;Driving;Meals; Bathing; Ambulating;Dressing;Medications; Feeding; Toileting;Finances   Services in place prior to admission Home Health Care;DME/Supplies at home   [address] [facility]   Type of DME/Supplies Wheelchair;Commode;Nebulizer; [address] Lift   Discharge Needs   Anticipated D/C needs Home health care     Received MDO for [facility] Evaluation. Pt is a READMISSION. Pt was DC'd 7/1/2022 w/ [facility] RN. MYKE met w/ pt's dtr Floyd and pt's son Jazmyne Beaulieu in pt's room. Above assessment completed. MYKE confirmed pt is active w/ [facility] for [facility] RN/NP Salinas Silva. Per pt's dtr - PT did not start post DC from [address] [place] [address] on 7/1 as pt is on blood thinners and her NP did not feel it was appropriate yet. Pt had a Private Duty CG who recently quit. Pt's family helps Floyd as much as possible. Per request, MYKE sent CG information and PCP referral phone line info to pt's dtr via email: [email or web address].    MYKE also contacted NP Nicholas Vasquez office at: [phone] and spoke to Darby Sparrow. Darby Sparrow confirmed pt is currently active w/ their services and she will update pt's NP of pt's admission.     As requested, MYKE sent updates including L[facility]YD orders and F2F to [facility] via fax: [phone]. [address]  Phone #: [phone]  Fax #: [phone]    Pt's dtr confirmed pt will require Ambulance transport home at OR. PLAN: Home w/ [address], needs Ambulance at [place], [place] completed (needs date) - pending clinical course      SW/CM to remain available for support and/or discharge planning.          Jaquan Yao, MSW, [address] [facility]

## 2022-07-22 NOTE — ED INITIAL ASSESSMENT (HPI)
Pt to ED with c/o blood in stool since 2230 tonight. Pts daughter has pts depends with her. Pt also c/o diarrhea and abd pain that also started tonight. A&OX2-baseline.

## 2022-07-22 NOTE — ED QUICK NOTES
Orders for admission. Patient and/or next of kin aware of plan and is ready to go upstairs. Please call ED RN Bell Elizondo at listed extension should you have any further questions or concerns. Thank you. 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 ENLARGED INFRARENAL AAA MEASURING UP TO 5.2 CM, FROM PREVIOUS 4.1 CM. Resp: ROOM AIR    GI: MELENA WITH BLOOD CLOTS NOTED HECTOR-RECTALLY. : DIAPERED    Skin/IV: L AC (20) PATENT AND SALINE LOCKED. Other Pertinent Information: UA AND STOOL PANEL.     Type of COVID Test Sent: RAPID    COVID Level of Suspicion: LOW    PRIMARY CARE PARTNER:

## 2022-07-22 NOTE — ED QUICK NOTES
Pt noted to have soiled pull-up diaper with hermes dark, malodorous stool with melena and blood clots. Hayley care provided with rn assist. Pt tolerated well.

## 2022-07-23 LAB
ANION GAP SERPL CALC-SCNC: 5 MMOL/L (ref 0–18)
BUN BLD-MCNC: 15 MG/DL (ref 7–18)
BUN/CREAT SERPL: 21.1 (ref 10–20)
CALCIUM BLD-MCNC: 7.6 MG/DL (ref 8.5–10.1)
CHLORIDE SERPL-SCNC: 111 MMOL/L (ref 98–112)
CO2 SERPL-SCNC: 26 MMOL/L (ref 21–32)
CREAT BLD-MCNC: 0.71 MG/DL
DEPRECATED RDW RBC AUTO: 43.6 FL (ref 35.1–46.3)
ERYTHROCYTE [DISTWIDTH] IN BLOOD BY AUTOMATED COUNT: 13.2 % (ref 11–15)
GLUCOSE BLD-MCNC: 70 MG/DL (ref 70–99)
HCT VFR BLD AUTO: 29.3 %
HGB BLD-MCNC: 9 G/DL
INR BLD: 1.53 (ref 0.8–1.2)
MAGNESIUM SERPL-MCNC: 2 MG/DL (ref 1.6–2.6)
MCH RBC QN AUTO: 27.9 PG (ref 26–34)
MCHC RBC AUTO-ENTMCNC: 30.7 G/DL (ref 31–37)
MCV RBC AUTO: 90.7 FL
OSMOLALITY SERPL CALC.SUM OF ELEC: 293 MOSM/KG (ref 275–295)
PLATELET # BLD AUTO: 153 10(3)UL (ref 150–450)
POTASSIUM SERPL-SCNC: 3.6 MMOL/L (ref 3.5–5.1)
PROTHROMBIN TIME: 18.6 SECONDS (ref 11.6–14.8)
RBC # BLD AUTO: 3.23 X10(6)UL
SODIUM SERPL-SCNC: 142 MMOL/L (ref 136–145)
WBC # BLD AUTO: 5.1 X10(3) UL (ref 4–11)

## 2022-07-23 PROCEDURE — 99497 ADVNCD CARE PLAN 30 MIN: CPT | Performed by: HOSPITALIST

## 2022-07-23 PROCEDURE — 99233 SBSQ HOSP IP/OBS HIGH 50: CPT | Performed by: HOSPITALIST

## 2022-07-23 PROCEDURE — 99232 SBSQ HOSP IP/OBS MODERATE 35: CPT | Performed by: INTERNAL MEDICINE

## 2022-07-23 NOTE — PLAN OF CARE
Problem: PAIN - ADULT  Goal: Verbalizes/displays adequate comfort level or patient's stated pain goal  Description: INTERVENTIONS:  - Encourage pt to monitor pain and request assistance  - Assess pain using appropriate pain scale  - Administer analgesics based on type and severity of pain and evaluate response  - Implement non-pharmacological measures as appropriate and evaluate response  - Consider cultural and social influences on pain and pain management  - Manage/alleviate anxiety  - Utilize distraction and/or relaxation techniques  - Monitor for opioid side effects  - Notify MD/LIP if interventions unsuccessful or patient reports new pain  - Anticipate increased pain with activity and pre-medicate as appropriate  7/23/2022 0524 by Severo Brewster RN  Outcome: Progressing  7/23/2022 0524 by Severo Brewster RN  Outcome: Progressing     Problem: SKIN/TISSUE INTEGRITY - ADULT  Goal: Skin integrity remains intact  Description: INTERVENTIONS  - Assess and document risk factors for pressure ulcer development  - Assess and document skin integrity  - Monitor for areas of redness and/or skin breakdown  - Initiate interventions, skin care algorithm/standards of care as needed  Outcome: Progressing     Problem: HEMATOLOGIC - ADULT  Goal: Maintains hematologic stability  Description: INTERVENTIONS  - Assess for signs and symptoms of bleeding or hemorrhage  - Monitor labs and vital signs for trends  - Administer supportive blood products/factors, fluids and medications as ordered and appropriate  - Administer supportive blood products/factors as ordered and appropriate  Outcome: Not Progressing  Goal: Free from bleeding injury  Description: (Example usage: patient with low platelets)  INTERVENTIONS:  - Avoid intramuscular injections, enemas and rectal medication administration  - Ensure safe mobilization of patient  - Hold pressure on venipuncture sites to achieve adequate hemostasis  - Assess for signs and symptoms of internal bleeding  - Monitor lab trends  - Patient is to report abnormal signs of bleeding to staff  - Avoid use of toothpicks and dental floss  - Use electric shaver for shaving  - Use soft bristle tooth brush  - Limit straining and forceful nose blowing  Outcome: Not Progressing       Patient alert but confused, vitals stable, no c/o pain. Patient refusing to drink prep for colonoscopy, MD notified, no new orders at this  time. Patient assisted with needs to stay clean, dry, and comfortable. Daughter at bedside. Consent signed. Iv fluids running. Call light within reach.

## 2022-07-23 NOTE — PLAN OF CARE
Patient's colonoscopy cancelled by GI today as patient would not drink prep. Family updated and understands. Turned and repositioned frequently. Safety precautions in place. Problem: Patient Centered Care  Goal: Patient preferences are identified and integrated in the patient's plan of care  Description: Interventions:  - What would you like us to know as we care for you? I live with my daughter.  - Provide timely, complete, and accurate information to patient/family  - Incorporate patient and family knowledge, values, beliefs, and cultural backgrounds into the planning and delivery of care  - Encourage patient/family to participate in care and decision-making at the level they choose  - Honor patient and family perspectives and choices  Outcome: Progressing     Problem: Patient/Family Goals  Goal: Patient/Family Long Term Goal  Description: Patient's Long Term Goal: To be free of further bleeding. Interventions:  - See additional Care Plan goals for specific interventions  Outcome: Progressing  Goal: Patient/Family Short Term Goal  Description: Patient's Short Term Goal: To return home.     Interventions:   - See additional Care Plan goals for specific interventions  Outcome: Progressing     Problem: HEMATOLOGIC - ADULT  Goal: Maintains hematologic stability  Description: INTERVENTIONS  - Assess for signs and symptoms of bleeding or hemorrhage  - Monitor labs and vital signs for trends  - Administer supportive blood products/factors, fluids and medications as ordered and appropriate  - Administer supportive blood products/factors as ordered and appropriate  Outcome: Progressing  Goal: Free from bleeding injury  Description: (Example usage: patient with low platelets)  INTERVENTIONS:  - Avoid intramuscular injections, enemas and rectal medication administration  - Ensure safe mobilization of patient  - Hold pressure on venipuncture sites to achieve adequate hemostasis  - Assess for signs and symptoms of internal bleeding  - Monitor lab trends  - Patient is to report abnormal signs of bleeding to staff  - Avoid use of toothpicks and dental floss  - Use electric shaver for shaving  - Use soft bristle tooth brush  - Limit straining and forceful nose blowing  Outcome: Progressing     Problem: PAIN - ADULT  Goal: Verbalizes/displays adequate comfort level or patient's stated pain goal  Description: INTERVENTIONS:  - Encourage pt to monitor pain and request assistance  - Assess pain using appropriate pain scale  - Administer analgesics based on type and severity of pain and evaluate response  - Implement non-pharmacological measures as appropriate and evaluate response  - Consider cultural and social influences on pain and pain management  - Manage/alleviate anxiety  - Utilize distraction and/or relaxation techniques  - Monitor for opioid side effects  - Notify MD/LIP if interventions unsuccessful or patient reports new pain  - Anticipate increased pain with activity and pre-medicate as appropriate  Outcome: Progressing     Problem: SKIN/TISSUE INTEGRITY - ADULT  Goal: Skin integrity remains intact  Description: INTERVENTIONS  - Assess and document risk factors for pressure ulcer development  - Assess and document skin integrity  - Monitor for areas of redness and/or skin breakdown  - Initiate interventions, skin care algorithm/standards of care as needed  Outcome: Progressing

## 2022-07-23 NOTE — PLAN OF CARE
Pt received A&O x 2, from home with family, c/o rectal bleeding. This RN visualized melena and small clots in stool. Prompt incontinent care provided, skin intact. Pt c/o pain to abdomen, PRN tylenol administered per order, pt on IV fluids. Plan is clear liquid diet and NPO at midnight, pt declined lunch. Vascular to assess pt prior to colonoscopy tomorrow. Family had a few questions for MD regarding scope prior to signing consent for pt. Problem: Patient Centered Care  Goal: Patient preferences are identified and integrated in the patient's plan of care  Description: Interventions:  - What would you like us to know as we care for you?  I live at home with my family   - Provide timely, complete, and accurate information to patient/family  - Incorporate patient and family knowledge, values, beliefs, and cultural backgrounds into the planning and delivery of care  - Encourage patient/family to participate in care and decision-making at the level they choose  - Honor patient and family perspectives and choices  Outcome: Progressing     Problem: Patient/Family Goals  Goal: Patient/Family Long Term Goal  Description: Patient's Long Term Goal: to be discharged home safely     Interventions:  - follow MD orders  - See additional Care Plan goals for specific interventions  Outcome: Progressing  Goal: Patient/Family Short Term Goal  Description: Patient's Short Term Goal: decrease pain    Interventions:   - PRN pain medications   -non pharmacologic pain interventions including hot or cold compress, deep breathing techniques  - See additional Care Plan goals for specific interventions  Outcome: Progressing     Problem: PAIN - ADULT  Goal: Verbalizes/displays adequate comfort level or patient's stated pain goal  Description: INTERVENTIONS:  - Encourage pt to monitor pain and request assistance  - Assess pain using appropriate pain scale  - Administer analgesics based on type and severity of pain and evaluate response  - Implement non-pharmacological measures as appropriate and evaluate response  - Consider cultural and social influences on pain and pain management  - Manage/alleviate anxiety  - Utilize distraction and/or relaxation techniques  - Monitor for opioid side effects  - Notify MD/LIP if interventions unsuccessful or patient reports new pain  - Anticipate increased pain with activity and pre-medicate as appropriate  Outcome: Progressing

## 2022-07-24 LAB
ANION GAP SERPL CALC-SCNC: 6 MMOL/L (ref 0–18)
BASOPHILS # BLD AUTO: 0.05 X10(3) UL (ref 0–0.2)
BASOPHILS NFR BLD AUTO: 0.9 %
BILIRUB UR QL: NEGATIVE
BUN BLD-MCNC: 12 MG/DL (ref 7–18)
BUN/CREAT SERPL: 14.8 (ref 10–20)
CALCIUM BLD-MCNC: 7.9 MG/DL (ref 8.5–10.1)
CHLORIDE SERPL-SCNC: 115 MMOL/L (ref 98–112)
CLARITY UR: CLEAR
CO2 SERPL-SCNC: 25 MMOL/L (ref 21–32)
COLOR UR: YELLOW
CREAT BLD-MCNC: 0.81 MG/DL
DEPRECATED RDW RBC AUTO: 45.1 FL (ref 35.1–46.3)
EOSINOPHIL # BLD AUTO: 0.37 X10(3) UL (ref 0–0.7)
EOSINOPHIL NFR BLD AUTO: 6.9 %
ERYTHROCYTE [DISTWIDTH] IN BLOOD BY AUTOMATED COUNT: 13.2 % (ref 11–15)
GLUCOSE BLD-MCNC: 69 MG/DL (ref 70–99)
GLUCOSE UR-MCNC: NEGATIVE MG/DL
HCT VFR BLD AUTO: 29.6 %
HGB BLD-MCNC: 8.9 G/DL
IMM GRANULOCYTES # BLD AUTO: 0.02 X10(3) UL (ref 0–1)
IMM GRANULOCYTES NFR BLD: 0.4 %
KETONES UR-MCNC: NEGATIVE MG/DL
LEUKOCYTE ESTERASE UR QL STRIP.AUTO: NEGATIVE
LYMPHOCYTES # BLD AUTO: 1.78 X10(3) UL (ref 1–4)
LYMPHOCYTES NFR BLD AUTO: 33 %
MCH RBC QN AUTO: 27.6 PG (ref 26–34)
MCHC RBC AUTO-ENTMCNC: 30.1 G/DL (ref 31–37)
MCV RBC AUTO: 91.9 FL
MONOCYTES # BLD AUTO: 0.55 X10(3) UL (ref 0.1–1)
MONOCYTES NFR BLD AUTO: 10.2 %
NEUTROPHILS # BLD AUTO: 2.63 X10 (3) UL (ref 1.5–7.7)
NEUTROPHILS # BLD AUTO: 2.63 X10(3) UL (ref 1.5–7.7)
NEUTROPHILS NFR BLD AUTO: 48.6 %
NITRITE UR QL STRIP.AUTO: NEGATIVE
OSMOLALITY SERPL CALC.SUM OF ELEC: 300 MOSM/KG (ref 275–295)
PH UR: 5 [PH] (ref 5–8)
PLATELET # BLD AUTO: 178 10(3)UL (ref 150–450)
POTASSIUM SERPL-SCNC: 3.7 MMOL/L (ref 3.5–5.1)
PROT UR-MCNC: NEGATIVE MG/DL
RBC # BLD AUTO: 3.22 X10(6)UL
SODIUM SERPL-SCNC: 146 MMOL/L (ref 136–145)
SP GR UR STRIP: 1.01 (ref 1–1.03)
UROBILINOGEN UR STRIP-ACNC: 1
WBC # BLD AUTO: 5.4 X10(3) UL (ref 4–11)

## 2022-07-24 PROCEDURE — 99233 SBSQ HOSP IP/OBS HIGH 50: CPT | Performed by: HOSPITALIST

## 2022-07-24 NOTE — PLAN OF CARE
No acute changes. Family at bedside. Denies pain. Good appetite. Up to chair a lot of the day. Safety precautions in place. Problem: Patient Centered Care  Goal: Patient preferences are identified and integrated in the patient's plan of care  Description: Interventions:  - What would you like us to know as we care for you? \"I am from home with my family. \"  - Provide timely, complete, and accurate information to patient/family  - Incorporate patient and family knowledge, values, beliefs, and cultural backgrounds into the planning and delivery of care  - Encourage patient/family to participate in care and decision-making at the level they choose  - Honor patient and family perspectives and choices  Outcome: Progressing     Problem: Patient/Family Goals  Goal: Patient/Family Long Term Goal  Description: Patient's Long Term Goal: \"to go home\"    Interventions:  -Follow up MD appt  -Take meds as prescribed  -Use of assistive device if needed  - See additional Care Plan goals for specific interventions  Outcome: Progressing  Goal: Patient/Family Short Term Goal  Description: Patient's Short Term Goal: \"to prevent from bleeding\"    Interventions:   -Monitor VS  -Check labs results  -Administer IVF ordered  -Provide pain meds as prescribed  - See additional Care Plan goals for specific interventions  Outcome: Progressing     Problem: HEMATOLOGIC - ADULT  Goal: Maintains hematologic stability  Description: INTERVENTIONS  - Assess for signs and symptoms of bleeding or hemorrhage  - Monitor labs and vital signs for trends  - Administer supportive blood products/factors, fluids and medications as ordered and appropriate  - Administer supportive blood products/factors as ordered and appropriate  Outcome: Progressing  Goal: Free from bleeding injury  Description: (Example usage: patient with low platelets)  INTERVENTIONS:  - Avoid intramuscular injections, enemas and rectal medication administration  - Ensure safe mobilization of patient  - Hold pressure on venipuncture sites to achieve adequate hemostasis  - Assess for signs and symptoms of internal bleeding  - Monitor lab trends  - Patient is to report abnormal signs of bleeding to staff  - Avoid use of toothpicks and dental floss  - Use electric shaver for shaving  - Use soft bristle tooth brush  - Limit straining and forceful nose blowing  Outcome: Progressing     Problem: PAIN - ADULT  Goal: Verbalizes/displays adequate comfort level or patient's stated pain goal  Description: INTERVENTIONS:  - Encourage pt to monitor pain and request assistance  - Assess pain using appropriate pain scale  - Administer analgesics based on type and severity of pain and evaluate response  - Implement non-pharmacological measures as appropriate and evaluate response  - Consider cultural and social influences on pain and pain management  - Manage/alleviate anxiety  - Utilize distraction and/or relaxation techniques  - Monitor for opioid side effects  - Notify MD/LIP if interventions unsuccessful or patient reports new pain  - Anticipate increased pain with activity and pre-medicate as appropriate  Outcome: Progressing     Problem: SKIN/TISSUE INTEGRITY - ADULT  Goal: Skin integrity remains intact  Description: INTERVENTIONS  - Assess and document risk factors for pressure ulcer development  - Assess and document skin integrity  - Monitor for areas of redness and/or skin breakdown  - Initiate interventions, skin care algorithm/standards of care as needed  Outcome: Progressing

## 2022-07-24 NOTE — PLAN OF CARE
Problem: Patient Centered Care  Goal: Patient preferences are identified and integrated in the patient's plan of care  Description: Interventions:  - What would you like us to know as we care for you? \"I am from home with my family. \"  - Provide timely, complete, and accurate information to patient/family  - Incorporate patient and family knowledge, values, beliefs, and cultural backgrounds into the planning and delivery of care  - Encourage patient/family to participate in care and decision-making at the level they choose  - Honor patient and family perspectives and choices  Outcome: Progressing     Problem: Patient/Family Goals  Goal: Patient/Family Long Term Goal  Description: Patient's Long Term Goal: \"to go home\"    Interventions:  -Follow up MD appt  -Take meds as prescribed  -Use of assistive device if needed   - See additional Care Plan goals for specific interventions  Outcome: Progressing  Goal: Patient/Family Short Term Goal  Description: Patient's Short Term Goal: \"to monitor bleeding\"    Interventions:   -Monitor VS  -Check labs results  -Administer IVF as ordered  -Provide pain meds as prescribed  - See additional Care Plan goals for specific interventions  Outcome: Progressing     Problem: HEMATOLOGIC - ADULT  Goal: Maintains hematologic stability  Description: INTERVENTIONS  - Assess for signs and symptoms of bleeding or hemorrhage  - Monitor labs and vital signs for trends  - Administer supportive blood products/factors, fluids and medications as ordered and appropriate  - Administer supportive blood products/factors as ordered and appropriate  Outcome: Progressing  Goal: Free from bleeding injury  Description: (Example usage: patient with low platelets)  INTERVENTIONS:  - Avoid intramuscular injections, enemas and rectal medication administration  - Ensure safe mobilization of patient  - Hold pressure on venipuncture sites to achieve adequate hemostasis  - Assess for signs and symptoms of internal bleeding  - Monitor lab trends  - Patient is to report abnormal signs of bleeding to staff  - Avoid use of toothpicks and dental floss  - Use electric shaver for shaving  - Use soft bristle tooth brush  - Limit straining and forceful nose blowing  Outcome: Progressing     Problem: PAIN - ADULT  Goal: Verbalizes/displays adequate comfort level or patient's stated pain goal  Description: INTERVENTIONS:  - Encourage pt to monitor pain and request assistance  - Assess pain using appropriate pain scale  - Administer analgesics based on type and severity of pain and evaluate response  - Implement non-pharmacological measures as appropriate and evaluate response  - Consider cultural and social influences on pain and pain management  - Manage/alleviate anxiety  - Utilize distraction and/or relaxation techniques  - Monitor for opioid side effects  - Notify MD/LIP if interventions unsuccessful or patient reports new pain  - Anticipate increased pain with activity and pre-medicate as appropriate  Outcome: Progressing     Problem: SKIN/TISSUE INTEGRITY - ADULT  Goal: Skin integrity remains intact  Description: INTERVENTIONS  - Assess and document risk factors for pressure ulcer development  - Assess and document skin integrity  - Monitor for areas of redness and/or skin breakdown  - Initiate interventions, skin care algorithm/standards of care as needed  Outcome: Progressing     Patient is alert and oriented x 2-3, family at bedside. She denies to be in any pain or discomfort at this time. No acute changes at this time. Safety and fall precautions maintained, bed alarm on. Call light within reach. Frequent rounding by nursing staff.

## 2022-07-24 NOTE — CM/SW NOTE
MDO- Palliative and Hospice; [facility] lvm for inpatient palliative care to inform them of consult. SW also contacted inpatient hospice to inform them of consult. SW to continue following and evaluate discharge needs.      Sim Martin, [facility]

## 2022-07-24 NOTE — HOSPICE RN NOTE
[facility] order received. Aidin sent. Meeting set up with patient's daughter Echo Blakely at bedside tomorrow 7/25 at 12PM to discuss hospice philosophy and goals of care.     Shelli Siegel, [facility]  [phone]  [id number]

## 2022-07-24 NOTE — PROGRESS NOTES
Brief GI Note:    Patient not seen today. Discussed with Dr. Mary Tamayo. Will sign off. Please call with questions.     Vijay Sparks MD  [facility] - Gastroenterology  7/24/2022  3:56 PM

## 2022-07-25 VITALS
TEMPERATURE: 99 F | RESPIRATION RATE: 16 BRPM | SYSTOLIC BLOOD PRESSURE: 138 MMHG | HEART RATE: 83 BPM | HEIGHT: 66 IN | DIASTOLIC BLOOD PRESSURE: 61 MMHG | BODY MASS INDEX: 22.18 KG/M2 | OXYGEN SATURATION: 99 % | WEIGHT: 138 LBS

## 2022-07-25 PROCEDURE — 99239 HOSP IP/OBS DSCHRG MGMT >30: CPT | Performed by: HOSPITALIST

## 2022-07-25 NOTE — PALLIATIVE CARE NOTE
Alirio Carney discussed with Dr Elvia Wilkinson family met with hospice plan for home with hospice care. Will defer palliative care consult for now. Will hold off on palliative care consult today. Will follow back tomorrow if pt is still here.

## 2022-07-25 NOTE — PLAN OF CARE
Patient going home with [facility] hospice care set up. RN went over discharge instructions with daughters at bedside including follow up and medication changes. Daughter had question about metroprol being stopped, RN paged MD for reason per request and explained reason. Daughter verbalized understanding and will go over with PCP. Telemetry box removed and returned by tele. Patient discharged home via ambulance and accompanied by two ambulance attendees. Patient stable at time of discharge. Problem: Patient Centered Care  Goal: Patient preferences are identified and integrated in the patient's plan of care  Description: Interventions:  - What would you like us to know as we care for you? \"I am from home with my family. \"  - Provide timely, complete, and accurate information to patient/family  - Incorporate patient and family knowledge, values, beliefs, and cultural backgrounds into the planning and delivery of care  - Encourage patient/family to participate in care and decision-making at the level they choose  - Honor patient and family perspectives and choices  Outcome: Adequate for Discharge     Problem: Patient/Family Goals  Goal: Patient/Family Long Term Goal  Description: Patient's Long Term Goal: \"to go home\"    Interventions:  -Follow up MD appt  -Take meds as prescribed  -Use of assistive device if needed  - See additional Care Plan goals for specific interventions  Outcome: Adequate for Discharge  Goal: Patient/Family Short Term Goal  Description: Patient's Short Term Goal: \"to prevent from bleeding\"    Interventions:   -Monitor VS  -Check labs results  -Administer IVF ordered  -Provide pain meds as prescribed  - See additional Care Plan goals for specific interventions  Outcome: Adequate for Discharge     Problem: HEMATOLOGIC - ADULT  Goal: Maintains hematologic stability  Description: INTERVENTIONS  - Assess for signs and symptoms of bleeding or hemorrhage  - Monitor labs and vital signs for trends  - Administer supportive blood products/factors, fluids and medications as ordered and appropriate  - Administer supportive blood products/factors as ordered and appropriate  Outcome: Adequate for Discharge  Goal: Free from bleeding injury  Description: (Example usage: patient with low platelets)  INTERVENTIONS:  - Avoid intramuscular injections, enemas and rectal medication administration  - Ensure safe mobilization of patient  - Hold pressure on venipuncture sites to achieve adequate hemostasis  - Assess for signs and symptoms of internal bleeding  - Monitor lab trends  - Patient is to report abnormal signs of bleeding to staff  - Avoid use of toothpicks and dental floss  - Use electric shaver for shaving  - Use soft bristle tooth brush  - Limit straining and forceful nose blowing  Outcome: Adequate for Discharge     Problem: PAIN - ADULT  Goal: Verbalizes/displays adequate comfort level or patient's stated pain goal  Description: INTERVENTIONS:  - Encourage pt to monitor pain and request assistance  - Assess pain using appropriate pain scale  - Administer analgesics based on type and severity of pain and evaluate response  - Implement non-pharmacological measures as appropriate and evaluate response  - Consider cultural and social influences on pain and pain management  - Manage/alleviate anxiety  - Utilize distraction and/or relaxation techniques  - Monitor for opioid side effects  - Notify MD/LIP if interventions unsuccessful or patient reports new pain  - Anticipate increased pain with activity and pre-medicate as appropriate  Outcome: Adequate for Discharge     Problem: SKIN/TISSUE INTEGRITY - ADULT  Goal: Skin integrity remains intact  Description: INTERVENTIONS  - Assess and document risk factors for pressure ulcer development  - Assess and document skin integrity  - Monitor for areas of redness and/or skin breakdown  - Initiate interventions, skin care algorithm/standards of care as needed  Outcome: Adequate for Discharge

## 2022-07-25 NOTE — CM/SW NOTE
LILLIAN was notified by Sonia Frazier with [facility] that they do not service [place] address, pt will need to be re referred. Reuben notified pts dtr of above. CM opened ref and sent to 5 new hospice agencies. 1200  CM spoke with dtr George Alcala re hospice. Ginny Choi she has an open covnversation with [facility] hospitals, she will contact hospice once pt is home. Dtr is agreeable to receive list of available hospice agencies. Lillian notified MD/RN of above, dtr requesting dc asap. Plan  Home with family  [facility] will call/pcs done      / to remain available for support and/or discharge planning.      Tiffany Pleitez RN    Ext [phone]

## 2022-07-25 NOTE — PLAN OF CARE
Problem: Patient Centered Care  Goal: Patient preferences are identified and integrated in the patient's plan of care  Description: Interventions:  - What would you like us to know as we care for you? \"I am from home with my family. \"  - Provide timely, complete, and accurate information to patient/family  - Incorporate patient and family knowledge, values, beliefs, and cultural backgrounds into the planning and delivery of care  - Encourage patient/family to participate in care and decision-making at the level they choose  - Honor patient and family perspectives and choices  Outcome: Progressing     Problem: Patient/Family Goals  Goal: Patient/Family Long Term Goal  Description: Patient's Long Term Goal: \"to go home\"    Interventions:  -Follow up MD appt  -Take meds as prescribed  -Use of assistive device if needed  - See additional Care Plan goals for specific interventions  Outcome: Progressing  Goal: Patient/Family Short Term Goal  Description: Patient's Short Term Goal: \"to prevent from bleeding\"    Interventions:   -Monitor VS  -Check labs results  -Administer IVF ordered  -Provide pain meds as prescribed  - See additional Care Plan goals for specific interventions  Outcome: Progressing     Problem: HEMATOLOGIC - ADULT  Goal: Maintains hematologic stability  Description: INTERVENTIONS  - Assess for signs and symptoms of bleeding or hemorrhage  - Monitor labs and vital signs for trends  - Administer supportive blood products/factors, fluids and medications as ordered and appropriate  - Administer supportive blood products/factors as ordered and appropriate  Outcome: Progressing  Goal: Free from bleeding injury  Description: (Example usage: patient with low platelets)  INTERVENTIONS:  - Avoid intramuscular injections, enemas and rectal medication administration  - Ensure safe mobilization of patient  - Hold pressure on venipuncture sites to achieve adequate hemostasis  - Assess for signs and symptoms of internal bleeding  - Monitor lab trends  - Patient is to report abnormal signs of bleeding to staff  - Avoid use of toothpicks and dental floss  - Use electric shaver for shaving  - Use soft bristle tooth brush  - Limit straining and forceful nose blowing  Outcome: Progressing     Problem: PAIN - ADULT  Goal: Verbalizes/displays adequate comfort level or patient's stated pain goal  Description: INTERVENTIONS:  - Encourage pt to monitor pain and request assistance  - Assess pain using appropriate pain scale  - Administer analgesics based on type and severity of pain and evaluate response  - Implement non-pharmacological measures as appropriate and evaluate response  - Consider cultural and social influences on pain and pain management  - Manage/alleviate anxiety  - Utilize distraction and/or relaxation techniques  - Monitor for opioid side effects  - Notify MD/LIP if interventions unsuccessful or patient reports new pain  - Anticipate increased pain with activity and pre-medicate as appropriate  Outcome: Progressing     Problem: SKIN/TISSUE INTEGRITY - ADULT  Goal: Skin integrity remains intact  Description: INTERVENTIONS  - Assess and document risk factors for pressure ulcer development  - Assess and document skin integrity  - Monitor for areas of redness and/or skin breakdown  - Initiate interventions, skin care algorithm/standards of care as needed  Outcome: Progressing     . Patient is alert and oriented x 2-3, family at bedside. She denies to be in any pain or discomfort at this time. No acute changes at this time. Safety and fall precautions maintained, bed alarm on. Call light within reach. Frequent rounding by nursing staff. Pt pulled out IV, family requested if pt can no longer have new IV since pt will be discharge today. Placed call to Dr. Dora Kitchen, per MD okay to discontinue IVF and okay for no IV site. Family made aware.

## 2022-07-25 NOTE — HOSPICE RN NOTE
Confirmed discharge address with daughter Sissy Rodriguez. Informed Sissy Rodriguez [facility] does not service in the [facility] where the patient will be discharging. Informed Sissy Rodriguez that Stanford Reich would speak with MSW and have them rerefer to another hospice agency that services that area. Spoke with [address] [place], [place] [address]edwin Butler MSW and informed of the current situation.

## 2024-05-20 NOTE — ED INITIAL ASSESSMENT (HPI)
Patient here with a congested cough, SOB, chest pain and fevers that began this morning. Some contact with some family with colds. (M6) obeys commands

## (undated) DEVICE — LAPAROVUE VISIBILITY SYSTEM LAPAROSCOPIC SOLUTIONS: Brand: LAPAROVUE

## (undated) DEVICE — Device

## (undated) DEVICE — TROCARS: Brand: KII® BALLOON BLUNT TIP SYSTEM

## (undated) DEVICE — TROCAR: Brand: KII FIOS FIRST ENTRY

## (undated) DEVICE — DISPOSABLE GRASPER CARTRIDGE: Brand: DIRECT DRIVE REPOSABLE GRASPERS

## (undated) DEVICE — SUTURE VICRYL 0 UR-6

## (undated) DEVICE — DRAPE,UNDRBUT,WHT GRAD PCH,CAPPORT,20/CS: Brand: MEDLINE

## (undated) DEVICE — LIGASURE LAP MARYLAND 37CM

## (undated) DEVICE — UNDYED BRAIDED (POLYGLACTIN 910), SYNTHETIC ABSORBABLE SUTURE: Brand: COATED VICRYL

## (undated) DEVICE — [HIGH FLOW INSUFFLATOR,  DO NOT USE IF PACKAGE IS DAMAGED,  KEEP DRY,  KEEP AWAY FROM SUNLIGHT,  PROTECT FROM HEAT AND RADIOACTIVE SOURCES.]: Brand: PNEUMOSURE

## (undated) DEVICE — DRAPE SHEET LAPCHOLE 124X100X7

## (undated) DEVICE — SOL  .9 1000ML BTL

## (undated) DEVICE — INTENDED FOR TISSUE SEPARATION, AND OTHER PROCEDURES THAT REQUIRE A SHARP SURGICAL BLADE TO PUNCTURE OR CUT.: Brand: BARD-PARKER ® STAINLESS STEEL BLADES

## (undated) DEVICE — LUBRICANT JLY SURGILUBE 2OZ

## (undated) DEVICE — 40580 - THE PINK PAD - ADVANCED TRENDELENBURG POSITIONING KIT: Brand: 40580 - THE PINK PAD - ADVANCED TRENDELENBURG POSITIONING KIT

## (undated) DEVICE — DISPOSABLE SUCTION/IRRIGATOR TUBE SET: Brand: AHTO

## (undated) DEVICE — YANKAUER SUCTION INSTRUMENT NO CONTROL VENT, BULB TIP, CLEAR: Brand: YANKAUER

## (undated) DEVICE — LEGGINGS SRG 48X31IN CUF STRL

## (undated) DEVICE — TROCAR: Brand: KII® SLEEVE

## (undated) DEVICE — GAMMEX® PI HYBRID SIZE 7.5, STERILE POWDER-FREE SURGICAL GLOVE, POLYISOPRENE AND NEOPRENE BLEND: Brand: GAMMEX

## (undated) DEVICE — A P RESECTION: Brand: MEDLINE INDUSTRIES, INC.

## (undated) NOTE — IP AVS SNAPSHOT
[facility]            (For Outpatient Use Only) Initial Admit Date: 2022   Inpt/Obs Admit Date: Inpt: 22 / Obs: N/A   Discharge Date:    Krissy Saleh:  [de-identified]   MRN: [de-identified]   CSN: [id number]   CEID: [id number]        ENCOUNTER  Patient Class: Inpatient Admitting Provider: No admitting provider for patient encounter. Unit: [address]/SE   Hospital Service: Medical Attending Provider: Merlyn Carmichael. Marco Antonio العلي MD   Bed: 536-A   Visit Type:   Referring Physician: No ref. provider found Billing Flag:    Admit Diagnosis: Acute deep vein thrombosis (DVT) of femoral vein of left lower extremity [facility]) [I82.412]      PATIENT  Legal Name:   Monique Smith    Legal Sex: Female  Gender ID:              [address] [facility] Name:    PCP:  Nolan Huff MD Home: [phone]   [facility]  [facility] 1935 (87 [facility] [place]: [phone]         City/State/Zip: [place] [facility] Marital:  Language: Merlyn Sheets: Jojo Mings: [id number] Adventist: [address] Not [facility]*     Race: Black Or  Ethnicity: Non  Or [address] CONTACT   Name Relationship Legal Guardian? Home Phone Work Phone Mobile Phone   1. Lorain Saint 2. Vijay Pean Daughter  Daughter No  No   [phone]     GUARANTOR  Guarantor: Alia Gallardo : 1935 Home Phone: [phone]   Address: [address] [place] [address].   Sex: Female Work Phone:    City/State/Zip: [place] [facility].   Rel. to Patient: Self Guarantor ID: [id number]   GUARANTOR EMPLOYER   Employer:  Status: RETIRED     COVERAGE  PRIMARY INSURANCE   Marie Palmer MA HMO   Group Number: [id number] Insurance Type: [address] Name: Joyce Ovalle : 1935   Subscriber ID: [id number] Pt Rel to Subscriber: Self   SECONDARY INSURANCE   Payor:  Plan:    Group Number:  Insurance Type:    Subscriber Name:  Subscriber :    Subscriber ID:  Pt Rel to Subscriber: TERTIARY INSURANCE   Payor:  Plan:    Group Number:  Insurance Type:    Subscriber Name:  Subscriber :    Subscriber ID:  Pt Rel to Subscriber:    Hospital Account Financial Class: Medicare Advantage    2022

## (undated) NOTE — LETTER
[address] [facility]     I agree to have a Peripherally Inserted Central Catheter (PICC) placed in my arm.    1. The PICC insertion procedure, care, maintenance, risks, benefits, and complications have been explained to me b also discussed reasonable alternatives to the PICC, including risks, benefits, and side effects related to the alternatives and risks related to not receiving this procedure.     8.  I have expressed any questions about this procedure to my physician or the

## (undated) NOTE — LETTER
[facility]  Administration of Anesthesia  1. I, Ellenbhavna Harris, or _________________________________ acting on her behalf, (Patient) (Dependent/Representative) request to receive anesthesia for my pending procedure/operation/treatment.   A infections, high spinal block, spinal bleeding, seizure, cardiac arrest and death. 7. AWARENESS: I understand that it is possible (but unlikely) to have explicit memory of events from the operating room while under general anesthesia.   8. ELECTROCONVULSIV unconscious pt /Relationship    My signature below affirms that prior to the time of the procedure, I have explained to the patient and/or his/her guardian, the risks and benefits of undergoing anesthesia, as well as any reasonable alternatives.     _______

## (undated) NOTE — LETTER
Mackenzie Orantes [address]  [facility], [facility], [address] [id number]  INFORMED CONSENT FOR TRANSFUSION OF BLOOD OR BLOOD PRODUCTS  My physician has informed me of the nature, purpose, benefits and risks of transfusion for blood and blood components that ______________________________________________  (Signature of Patient)                                                            (Responsible party in case of Minor,

## (undated) NOTE — LETTER
[facility]  Administration of Anesthesia  1. I, Tamiko Dickinson, or _________________________________ acting on her behalf, (Patient) (Dependent/Representative) request to receive anesthesia for my pending procedure/operation/treatment.   A infections, high spinal block, spinal bleeding, seizure, cardiac arrest and death. 7. AWARENESS: I understand that it is possible (but unlikely) to have explicit memory of events from the operating room while under general anesthesia.   8. ELECTROCONVULSIV unconscious pt /Relationship    My signature below affirms that prior to the time of the procedure, I have explained to the patient and/or his/her guardian, the risks and benefits of undergoing anesthesia, as well as any reasonable alternatives.     _______

## (undated) NOTE — IP AVS SNAPSHOT
[facility]            (For Outpatient Use Only) Initial Admit Date: 2022   Inpt/Obs Admit Date: Inpt: 22 / Obs: N/A   Discharge Date:    Hackberry Poles:  [de-identified]   MRN: [de-identified]   CSN: [id number]   CEID: [id number]        ENCOUNTER  Patient Class: Inpatient Admitting Provider: Kelly You MD Unit: [address] [place], [place] [address]W/SE   Hospital Service: Medical Attending Provider: Kelly You MD   Bed: 558-A   Visit Type:   Referring Physician: No ref. provider found Billing Flag:    Admit Diagnosis: Diverticulosis [H61.15]      PATIENT  Legal Name:   Minal Santos    Legal Sex: Female  Gender ID:              [address] [facility] Name:    PCP:  Sydney Salas MD Home: [phone]   Address:  [address] [place] [address] Way: 1935 (87 yrs) Mobile: [phone]         City/State/Zip: [facility] [address] [place] [address] Marital:  Language: Niko Goon: Hermes Foots: [phone] Jewish: [address] Not [facility]*     Race: Black Or  Ethnicity: Non  Or [address] CONTACT   Name Relationship Legal Guardian? Home Phone Work Phone [place] Phone   1. Thomas Plascencia  2. Caitlin Mcarthur Daughter  Daughter No  No   [phone]     GUARANTOR  Guarantor: Tre Meeks : 1935 Home Phone: [phone]   Address: [address] [place] [address].   Sex: Female Work Phone:    City/State/Zip: [facility] [address] [place] [address]   Rel. to Patient: Self Guarantor ID: [id number]   GUARANTOR EMPLOYER   Employer:  Status: RETIRED     COVERAGE  PRIMARY INSURANCE   PayBruce Jones MA HMO   Group Number: [id number] Insurance Type: [address] Name: Viola Stone : 1935   Subscriber ID: [id number] Pt Rel to Subscriber: Self   SECONDARY INSURANCE   Payor:  Plan:    Group Number:  Insurance Type:    Subscriber Name:  Subscriber :    Subscriber ID:  Pt Rel to Subscriber:    TERTIARY INSURANCE   Payor:  Plan:    Group Number:  Insurance Type: Subscriber Name:  Lina Guzman :    Subscriber ID:  Pt Rel to Subscriber:    Hospital Account Financial Class: Medicare Advantage    2022

## (undated) NOTE — LETTER
[address], [place]  Authorization for Invasive Procedures  1. I hereby authorize Dr. Krystina Cook , my physician and whomever may be designated as the doctor's assistant, to perform the following operation and/or procedure:  [facility].    2. My physician has explained to me the nature and purpose of the operation or other procedure, possible alternative methods of treatment, the risks involved and the possibility of complications to me. I understand the probable consequences of declining the recommended procedure and the alternative methods of treatment. I acknowledge that no guarantee has been made as to the result that may be obtained. 3. I recognize that during the course of this operation or other procedure, unforeseen conditions may necessitate additional or different procedures than those listed above. I, therefore, further authorize and request that the above-named physician, his/her physician assistants, or designees perform such procedures as are, in his/her professional opinion, necessary and desirable. If I have a Do Not Attempt Resuscitation (DNAR) order in place, that status will be suspended while in the operating room, procedural suite, and during the recovery period unless otherwise explicitly stated by me (or a person authorized to consent on my behalf). The surgeon or my attending physician will determine when the applicable recovery period ends for purposes of reinstating the DNAR order. 4. Should the need arise during my operation or immediate post-operative period; I also consent to the administration of blood and/or blood products.  Further, I understand that despite careful testing and screening of blood and blood products, I may still be subject to ill effects as a result of recieving a blood transfusion an/or blood producst. The following are some, but not all, of the potential risks that can occur: fever and allergic reactions, hemolytic reactions, transmission of disease such as hepatitis, AIDS, cytomegalovirus (CMV), and flluid overload. In the event that I wish to have autologous transfusions of my own blood, or a directed donor transfusion, I will discuss this with my physician. 5. I consent to the photographing of the operations or procedures to be performed for the purposes of advancing medicine, science, and/or education, provided my identity is not revealed. If the procedure has been videotaped, the physician/surgeon will obtain the original videotape. The hospital will not be responsible for storage or maintenance of this tape. 6. I consent to the presence of a  or observer as deemed necessary by my physician or his designee. 7. Any tissues or organs removed in the operation or other procedure may be disposed of by and at the discretion of [facility].    8. I understand that the physician and his/her physician assistants may not be employees or agents of [facility], [facility], [address], but are independent medical practitioners who have been permitted to use its facilities for the care and treatment of their patients. 9. Patients having a sterilization procedure: I understand that if the procedure is successful the results will be permanent and it will therefore be impossible for me to inseminate, conceive or bear children. I also understand that the procedure is intended to result in sterility, although the result has not been guaranteed. 10. I CERTIFY THAT I HAVE READ AND FULLY UNDERSTAND THE ABOVE CONSENT TO OPERATION and/or OTHER PROCEDURE. 11. I acknowledge that my physician has explained sedation/analgesia administration to me including the risks and benefits. I consent to the administration of sedation/analgesia as may be necessary or desirable in the judgment of my physician.      Signature of Patient:  ________________________________________________ Date: _________Time: _________    Responsible person in case of minor or unconscious: _____________________________Relationship: ____________     Witness Signature: ____________________________________________ Date: __________ Time: ___________    Statement of Physician  My signature below affirms that prior to the time of the procedure, I have explained to the patient and/or her legal representative, the risks and benefits involved in the proposed treatment and any reasonable alternative to the proposed treatment. I have also explained the risks and benefits involved in the refusal of the proposed treatment and have answered the patient's questions. If I have a significant financial interest in this procedure/surgery, I have disclosed this and had a discussion with my patient.     Signature of Physician:   ________________________________________Date: _________Time:_______ Patient Name: Nataliya Toussaint  : 1935   Printed: 2022    Medical Record #: [id number]

## (undated) NOTE — ED AVS SNAPSHOT
Lefty Russo   MRN: [id number]    Department:  [facility] Emergency Department   Date of Visit:  5/6/2019           Disclosure     Insurance plans vary and the physician(s) referred by the ER may not be covered by your plan.  Please contact y within the next three months to obtain basic health screening including reassessment of your blood pressure.     IF THERE IS ANY CHANGE OR WORSENING OF YOUR CONDITION, CALL YOUR PRIMARY CARE PHYSICIAN AT ONCE OR RETURN IMMEDIATELY TO THE EMERGENCY DEPARTMEN

## (undated) NOTE — LETTER
[address] [place] [address]      Authorization for Surgical Operation and Procedure     Date:___________                                                                                                         Time:_______ potential risks that can occur: fever and allergic reactions, hemolytic reactions, transmission of diseases such as Hepatitis, AIDS and Cytomegalovirus (CMV) and fluid overload.   In the event that I wish to have an autologous transfusion of my own blood, o attending physician will determine when the applicable recovery period ends for purposes of reinstating the DNAR order.   10. Patients having a sterilization procedure: I understand that if the procedure is successful the results will be permanent and it wi _______________________________________________________________ _____________________________  Yonas Rashid Physician)                                                                                         (Date)                                   (Time)